# Patient Record
Sex: FEMALE | Race: WHITE | NOT HISPANIC OR LATINO | Employment: FULL TIME | URBAN - METROPOLITAN AREA
[De-identification: names, ages, dates, MRNs, and addresses within clinical notes are randomized per-mention and may not be internally consistent; named-entity substitution may affect disease eponyms.]

---

## 2017-05-08 ENCOUNTER — HOSPITAL ENCOUNTER (OUTPATIENT)
Dept: OTHER | Facility: HOSPITAL | Age: 57
Discharge: HOME | End: 2017-05-08

## 2017-11-08 ENCOUNTER — HOSPITAL ENCOUNTER (OUTPATIENT)
Dept: OTHER | Facility: HOSPITAL | Age: 57
Discharge: HOME | End: 2017-11-08

## 2019-06-06 ENCOUNTER — HOSPITAL ENCOUNTER (OUTPATIENT)
Dept: OTHER | Facility: HOSPITAL | Age: 59
Discharge: HOME | End: 2019-06-06

## 2020-06-29 ENCOUNTER — HOSPITAL ENCOUNTER (OUTPATIENT)
Dept: OTHER | Facility: HOSPITAL | Age: 60
Discharge: HOME | End: 2020-06-29

## 2020-07-13 ENCOUNTER — HOSPITAL ENCOUNTER (OUTPATIENT)
Dept: OTHER | Facility: HOSPITAL | Age: 60
Discharge: HOME | End: 2020-07-13

## 2021-03-08 ENCOUNTER — HOSPITAL ENCOUNTER (OUTPATIENT)
Dept: OTHER | Facility: HOSPITAL | Age: 61
Discharge: HOME | End: 2021-03-08

## 2021-05-18 ENCOUNTER — OFFICE VISIT CONVERTED (OUTPATIENT)
Dept: GASTROENTEROLOGY | Facility: CLINIC | Age: 61
End: 2021-05-18
Attending: INTERNAL MEDICINE

## 2021-06-05 NOTE — H&P
History and Physical      Patient Name: Rajwinder An   Patient ID: [id number]   Sex: Female   YOB: 1960    Primary Care Provider: Wing Pederson   Referring Provider: Wing Pederson    Visit Date: May 18, 2021    Provider: Debra Wilkinson MD   Location: [facility]   Location Address: [address] [place] [address], [address]  [place], [place]  [id number]   Location Phone: [phone]          Chief Complaint  · Diarrhea      History Of Present Illness  The patient is a 60 year old /White female who presents on referral from Wing Pederson for a gastroenterology evaluation.      Ms. 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   Shreports h/o partial colon resection 2/2 endometriosis.  If not taking meds, will have 5 - 6 bowel movements daily.  Reports urgency with bowel movements.  No melena, hematochezia.  Reports colonoscopy 2013 for diarrhea and symptoms have persisted since.  Colonoscopy 2020 with microscopic colitis.  Has tried PeptoBismol, Colestid, Imodium.  Will have to take 2 Imodium daily with some relief.  Left upper/lower abd pain, constant, cramping, no radiation, mild.  Occ. N/V with improvement in abd pain.    Labs (5/6/21): WBC 6.2, Hgb 15.0, Plt 161, AST 48, ALT 30, alk phos 113, TB 1.8.    Colonoscopy (6/15/20): normal colon, small chronic external hemorrhoid.  Random bx's with microscopic colitis.       Past Surgical History  Colon resection; Colonoscopy; Hysterectomy         Medication List  Crestor 20 mg oral tablet; diclofenac sodium 100 mg oral tablet extended release 24 hr; escitalopram oxalate 5 mg oral tablet; lisinopril 20 mg oral tablet; Victoza 2-Jef 0.6 mg/0.1 mL (18 mg/3 mL) subcutaneous pen injector; Vitamin D3 Complete 18 mg iron-800 mcg-150 mg oral tablet; Zyrtec 10 mg oral tablet         Allergy List  PENICILLINS       Allergies Reconciled  Family Medical History  No family history of colorectal cancer  "        Social History  Tobacco (Never)         Review of Systems  · Constitutional  o Denies  o : chills, fever  · Eyes  o Denies  o : blurred vision, changes in vision  · Cardiovascular  o Denies  o : chest pain  · Respiratory  o Denies  o : shortness of breath  · Gastrointestinal  o Admits  o : See HPI  · Genitourinary  o Denies  o : dysuria, blood in urine  · Integument  o Denies  o : rash  · Neurologic  o Denies  o : tingling or numbness  · Musculoskeletal  o Denies  o : joint pain  · Endocrine  o Denies  o : weight gain, weight loss  · Psychiatric  o Denies  o : anxiety, depression      Vitals  Date Time BP Position Site L\R Cuff Size HR RR TEMP (F) WT  HT  BMI kg/m2 BSA m2 O2 Sat FR L/min FiO2        05/18/2021 02:58 /75 Sitting    83 - R   179lbs 0oz 5'  6\" 28.89 1.94             Physical Examination  · Constitutional  o Appearance  o : well developed, well-nourished, in no acute distress  · Eyes  o Vision  o :   § Visual Fields  § : eyes move symmetrical in all directions  o Sclerae  o : anicteric  o Pupils and Irises  o : pupils equal and symmetrical  · Neck  o Inspection/Palpation  o : supple  · Respiratory  o Respiratory Effort  o : breathing unlabored  o Inspection of Chest  o : normal appearance, no retractions  o Auscultation of Lungs  o : clear to auscultation bilaterally  · Cardiovascular  o Heart  o :   § Auscultation of Heart  § : no murmurs, gallops or rubs  · Gastrointestinal  o Abdominal Examination  o : soft, LUQ/LLQ tender to palpation, with normal active bowel sounds, no appreciable hepatosplenomegaly  o Digital Rectal Exam  o : deferred  · Lymphatic  o Neck  o : no palpable lymphadenopathy  · Skin and Subcutaneous Tissue  o General Inspection  o : without focal lesions; turgor is normal  · Psychiatric  o General  o : Alert and oriented x3  o Mood and Affect  o : Mood and affect are appropriate to circumstances          Assessment  · Abdominal " pain     789.00/R10.9  · Diarrhea     787.91/R19.7  · Microscopic colitis     558.9/K52.839    Problems Reconciled  Plan  · Orders  o C difficile Toxigenic Assay (PCR) [facility] (87955) - - 05/18/2021  o Stool culture and sensitivity (33984) - - 05/18/2021  o Giardia and Cryptosporidium Enzyme Immunoassay [facility] (70208, 61834) - - 05/18/2021  o Lactoferrin (Fecal) Qualitative (32455) - - 05/18/2021  · Medications  o budesonide 3 mg oral capsule,delayed,extend.release   SIG: take 3 capsules (9 mg) oral daily x 4 weeks, then 2 capsules oral daily x 2 weeks, then 1 capsule oral daily x 2 weeks   DISP: (126) Capsule with 0 refills  Prescribed on 05/18/2021     o Medications have been Reconciled  o Transition of Care or Provider Policy  · Instructions  o Diarrhea: will check stool studies to r/o other conditions. Given h/o of microscopic colitis on recent colonoscopy, will do budesonide with taper. Advised importance of completing stool studies prior to starting budesonide. Also discussed increased monitoring of glucose with steroid use in setting of DM.  · Disposition  o Follow Up in 4 months.            Electronically Signed by: Debra Wilkinson MD -Author on May 19, 2021 07:48:32 AM

## 2021-07-15 VITALS
HEART RATE: 83 BPM | SYSTOLIC BLOOD PRESSURE: 145 MMHG | DIASTOLIC BLOOD PRESSURE: 75 MMHG | BODY MASS INDEX: 28.77 KG/M2 | HEIGHT: 66 IN | WEIGHT: 179 LBS

## 2021-08-20 ENCOUNTER — HOSPITAL ENCOUNTER (OUTPATIENT)
Dept: OTHER | Facility: HOSPITAL | Age: 61
Discharge: HOME | End: 2021-08-20

## 2021-08-31 ENCOUNTER — HOSPITAL ENCOUNTER (OUTPATIENT)
Dept: OTHER | Facility: HOSPITAL | Age: 61
Discharge: HOME | End: 2021-08-31

## 2021-10-13 ENCOUNTER — HOSPITAL ENCOUNTER (OUTPATIENT)
Dept: OTHER | Facility: HOSPITAL | Age: 61
Discharge: HOME | End: 2021-10-13

## 2022-06-22 ENCOUNTER — HOSPITAL ENCOUNTER (OUTPATIENT)
Dept: OTHER | Facility: HOSPITAL | Age: 62
Discharge: HOME | End: 2022-06-22

## 2023-03-02 DIAGNOSIS — M25.511 RIGHT SHOULDER PAIN, UNSPECIFIED CHRONICITY: Primary | ICD-10-CM

## 2023-03-14 ENCOUNTER — OFFICE VISIT (OUTPATIENT)
Dept: ORTHOPEDIC SURGERY | Facility: CLINIC | Age: 63
End: 2023-03-14
Payer: COMMERCIAL

## 2023-03-14 ENCOUNTER — HOSPITAL ENCOUNTER (OUTPATIENT)
Dept: GENERAL RADIOLOGY | Facility: HOSPITAL | Age: 63
Discharge: HOME | End: 2023-03-14
Admitting: PHYSICIAN ASSISTANT
Payer: COMMERCIAL

## 2023-03-14 VITALS — HEIGHT: 63 IN | TEMPERATURE: 97.8 F | WEIGHT: 174.6 LBS | BODY MASS INDEX: 30.94 KG/M2

## 2023-03-14 DIAGNOSIS — R29.898 SHOULDER WEAKNESS: ICD-10-CM

## 2023-03-14 DIAGNOSIS — G89.29 CHRONIC RIGHT SHOULDER PAIN: Primary | ICD-10-CM

## 2023-03-14 DIAGNOSIS — M25.511 CHRONIC RIGHT SHOULDER PAIN: Primary | ICD-10-CM

## 2023-03-14 DIAGNOSIS — M25.511 RIGHT SHOULDER PAIN, UNSPECIFIED CHRONICITY: ICD-10-CM

## 2023-03-14 PROCEDURE — 99204 OFFICE O/P NEW MOD 45 MIN: CPT | Performed by: PHYSICIAN ASSISTANT

## 2023-03-14 PROCEDURE — 73030 X-RAY EXAM OF SHOULDER: CPT

## 2023-03-14 RX ORDER — DICLOFENAC SODIUM 75 MG/1
TABLET, DELAYED RELEASE ORAL
COMMUNITY
Start: 2022-11-16

## 2023-03-14 RX ORDER — ESCITALOPRAM OXALATE 20 MG/1
TABLET ORAL
COMMUNITY
Start: 2022-11-16

## 2023-03-14 RX ORDER — CETIRIZINE HYDROCHLORIDE 10 MG/1
TABLET ORAL
COMMUNITY

## 2023-03-14 RX ORDER — SIMVASTATIN 5 MG
TABLET ORAL
COMMUNITY
Start: 2023-03-08

## 2023-03-14 RX ORDER — ERGOCALCIFEROL 1.25 MG/1
CAPSULE ORAL
COMMUNITY
Start: 2022-11-21

## 2023-03-14 RX ORDER — LIRAGLUTIDE 6 MG/ML
INJECTION SUBCUTANEOUS
COMMUNITY
Start: 2023-02-02

## 2023-03-14 RX ORDER — FAMOTIDINE 40 MG/1
TABLET, FILM COATED ORAL
COMMUNITY
Start: 2022-11-16

## 2023-03-14 RX ORDER — LISINOPRIL 20 MG/1
TABLET ORAL
COMMUNITY
Start: 2022-11-16

## 2023-03-14 RX ORDER — ONDANSETRON 4 MG/1
TABLET, FILM COATED ORAL
COMMUNITY
Start: 2023-03-08

## 2023-03-14 RX ORDER — CARVEDILOL 12.5 MG/1
TABLET ORAL
COMMUNITY
Start: 2023-03-08

## 2023-03-14 RX ORDER — EZETIMIBE 10 MG/1
TABLET ORAL
COMMUNITY
Start: 2022-11-16

## 2023-03-14 NOTE — PROGRESS NOTES
"Chief Complaint  Pain and Establish Care of the Right Shoulder    Subjective    History of Present Illness      Rajwinder An is a 62 y.o. female who presents to [facility] for complaint of Shoulder Pain: Patient complains of right shoulder pain.   The onset of the pain was gradual, starting about 1 year ago.    Symptoms are aggravated by raising her right arm up and sleeping on her side, bilaterally.   Symptoms are diminished by ice and diclofenac.         She reports her right shoulder began bothering her approximately 1 year ago. When it began, it was only aching and \"twinging\", these symptoms would radiate from her right shoulder to the lateral aspect of the upper arm. She reports when she raises her arm \"it would feel just like it was giving away\". The patient went to her family doctor and was referred to see Dr. Denise in [place]. She states she was informed she had a bone spur and received regular injections. Her most recent injection was approximately 6 months ago. She reports her symptoms are worsening. She experienced relief with the injections for a short period of time, she notes the first injection helped the most. Over time, the injection's effects are not lasting long.    The patient reports her pain is aggravated when she raises her arm up and sleeps on her side, bilaterally. She states she is unable to sleep on her left secondary to \"pulling\" on the shoulder. She will still experience pain even if she sits in a recliner. The patient notices improvement with ice and with her prescription of diclofenac. The patient's family doctor diagnosed the patient with BELL in her liver and was advised to use diclofenac very sparingly. She sees Carlos Marino MD for BELL. The patient experiences weakness and decrease in range of motion. Her range of motion worsens if she raises her arm. Intermittently, she would use her left arm to lift her right arm. She states " "she has difficulty lifting the arm above her head.    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        Objective   Vital Signs:   Temp 97.8 °F (36.6 °C)   Ht 158.8 cm (62.5\")   Wt 79.2 kg (174 lb 9.6 oz)   BMI 31.43 kg/m²       Physical Exam  Vitals and nursing note reviewed.   Constitutional:       Appearance: Normal appearance.   Pulmonary:      Effort: Pulmonary effort is normal.   Skin:     General: Skin is warm and dry.      Capillary Refill: Capillary refill takes less than 2 seconds.   Neurological:      General: No focal deficit present.      Mental Status: He is alert and oriented to person, place, and time. Mental status is at baseline.   Psychiatric:         Mood and Affect: Mood normal.         Behavior: Behavior normal.         Thought Content: Thought content normal.         Judgment: Judgment normal.         Ortho Exam   RIGHT shoulder  Positive for significant tenderness at the anterior aspect of the shoulder and at the insertion of the rotator cuff over the greater tuberosity of the humerus.   Positive for tenderness with palpation of the AC joint.  Soft tissue tenderness is noted.   Slight proximal migration of the humeral head is noted.   Forward flexion is 0-100 degrees, abduction is 0-90 degrees, external rotation is 0-30 degrees.   Behind back to low back. Assisted ROM she obtains increased ROM.  Positive for decreased strength in abduction and external rotation.   Crossover adduction test is positive.    Drop arm sign is positive.  Sulcus sign is negative.   Neer test is positive on compression.  There is no evidence of multidirectional instability.       Result Review :   Radiologic studies - see below for interpretation  RIGHT shoulder xrays   4 views were ordered by Lebron Starks PA-C. Performed at [facility] on 3/14/23. Images were independently viewed and interpreted " by myself, my impression as follows:  Findings: Moderate degenerative changes of the AC joint, mild to moderate degenerative changes of the glenohumeral joint, primarily at the bony glenoid  Bony lesion: no  Soft tissues: within normal limits  Joint spaces: decreased  Hardware appropriately positioned: not applicable  Prior studies available for comparison: yes from 2022    RIGHT shoulder xrays   3 views were performed at [facility] on 3/28/22. Images were independently viewed and interpreted by myself, my impression as follows:  · Mild degenerative changes of the before meals and glenohumeral joint        PROCEDURE  Procedures           Assessment   Assessment and Plan    Problem List Items Addressed This Visit        Musculoskeletal and Injuries    Chronic right shoulder pain - Primary    Relevant Orders    MRI Shoulder Right Without Contrast   Other Visit Diagnoses     Shoulder weakness        Relevant Orders    MRI Shoulder Right Without Contrast          Follow Up   · Discussion of any imaging in detail. Discussion of orthopaedic goals.  · Risk, benefits, and merits of treatment alternatives reviewed with the patient. Treatment alternatives include: further imaging/testing  · Ice, heat, and/or modalities as beneficial  · To schedule MRI of right shoulder without contrast to evaluate for RCT  · Patient is encouraged to call or return for any issues or concerns.  · No brace was given at today's visit.  · Follow up will be based on when MRI has been performed  • Patient was given instructions and counseling regarding her condition or for health maintenance advice. Please see specific information pulled into the AVS if appropriate.     Lebron Starks PA-C    Date of Encounter: 3/14/2023     Transcribed from ambient dictation for Lebron Starks PA-C by Radha Schneider.  03/14/23   13:20 EDT    Patient or patient representative verbalized consent to the visit recording.  I have personally  performed the services described in this document as transcribed by the above individual, and it is both accurate and complete.  Lebron Starks PA-C  3/14/2023  13:43 EDT

## 2023-03-17 ENCOUNTER — PATIENT ROUNDING (BHMG ONLY) (OUTPATIENT)
Dept: ORTHOPEDIC SURGERY | Facility: CLINIC | Age: 63
End: 2023-03-17
Payer: COMMERCIAL

## 2023-03-17 NOTE — PROGRESS NOTES
March 17, 2023    A [employer] Message has been sent to the patient for PATIENT ROUNDING with [facility]

## 2023-03-24 ENCOUNTER — TELEPHONE (OUTPATIENT)
Dept: ORTHOPEDIC SURGERY | Facility: CLINIC | Age: 63
End: 2023-03-24

## 2023-03-24 NOTE — TELEPHONE ENCOUNTER
Caller: KINZA MAYFIELD    Relationship to patient: SELF    Best call back number:[phone]    Patient is needing: REQ STATUS OF MRI BEING SCHEDULED- PLEASE CONTACT PATIENT TO GIVE STATUS UPDATE- PATIENT STATES OK TO LEAVE MSG ON PHONE IF SHE IS NOT ABLE TO ANSWER

## 2023-04-19 ENCOUNTER — TELEPHONE (OUTPATIENT)
Dept: ORTHOPEDIC SURGERY | Facility: CLINIC | Age: 63
End: 2023-04-19
Payer: COMMERCIAL

## 2023-04-19 NOTE — TELEPHONE ENCOUNTER
SPOKE TO PATIENT AND CANCELLED APPOINTMENT UNTIL SHE HAS HAD PHYSICAL THERAPY FOR 4 WEEKS.    ORDER WILL BE SENT TO [facility] AND THEN PATIENT WILL CALL BACK TO SCHEDULE A FOLLOW UP AS SOON AS SHE KNOWS HER FIRST P.T.VISIT

## 2023-05-08 ENCOUNTER — HOSPITAL ENCOUNTER (OUTPATIENT)
Dept: OTHER | Facility: HOSPITAL | Age: 63
Discharge: HOME | End: 2023-05-08

## 2024-10-11 ENCOUNTER — TELEPHONE (OUTPATIENT)
Dept: GASTROENTEROLOGY | Facility: CLINIC | Age: 64
End: 2024-10-11
Payer: COMMERCIAL

## 2024-10-11 NOTE — TELEPHONE ENCOUNTER
Please advise on referral, Do we see for this DX. Imaging from Feb.   Referral message sent, TE made for documentation purposes.

## 2024-10-14 ENCOUNTER — OFFICE VISIT (OUTPATIENT)
Dept: GASTROENTEROLOGY | Facility: CLINIC | Age: 64
End: 2024-10-14
Payer: COMMERCIAL

## 2024-10-14 ENCOUNTER — TELEPHONE (OUTPATIENT)
Dept: GASTROENTEROLOGY | Facility: CLINIC | Age: 64
End: 2024-10-14
Payer: COMMERCIAL

## 2024-10-14 VITALS
DIASTOLIC BLOOD PRESSURE: 67 MMHG | WEIGHT: 180 LBS | HEART RATE: 63 BPM | BODY MASS INDEX: 33.13 KG/M2 | HEIGHT: 62 IN | SYSTOLIC BLOOD PRESSURE: 168 MMHG

## 2024-10-14 DIAGNOSIS — K74.60 CIRRHOSIS OF LIVER WITHOUT ASCITES, UNSPECIFIED HEPATIC CIRRHOSIS TYPE: ICD-10-CM

## 2024-10-14 DIAGNOSIS — R15.2 INCONTINENCE OF FECES WITH FECAL URGENCY: ICD-10-CM

## 2024-10-14 DIAGNOSIS — K75.81 NASH (NONALCOHOLIC STEATOHEPATITIS): ICD-10-CM

## 2024-10-14 DIAGNOSIS — I85.00 ESOPHAGEAL VARICES WITHOUT BLEEDING, UNSPECIFIED ESOPHAGEAL VARICES TYPE: ICD-10-CM

## 2024-10-14 DIAGNOSIS — R15.9 INCONTINENCE OF FECES WITH FECAL URGENCY: ICD-10-CM

## 2024-10-14 DIAGNOSIS — K52.839 MICROSCOPIC COLITIS, UNSPECIFIED MICROSCOPIC COLITIS TYPE: ICD-10-CM

## 2024-10-14 DIAGNOSIS — R19.7 DIARRHEA, UNSPECIFIED TYPE: Primary | ICD-10-CM

## 2024-10-14 PROCEDURE — 99204 OFFICE O/P NEW MOD 45 MIN: CPT | Performed by: NURSE PRACTITIONER

## 2024-10-14 RX ORDER — MONTELUKAST SODIUM 4 MG/1
1 TABLET, CHEWABLE ORAL 2 TIMES DAILY
Qty: 30 TABLET | Refills: 3 | Status: SHIPPED | OUTPATIENT
Start: 2024-10-14

## 2024-10-14 RX ORDER — BRIMONIDINE TARTRATE AND TIMOLOL MALEATE 2; 5 MG/ML; MG/ML
SOLUTION OPHTHALMIC
COMMUNITY
Start: 2024-07-11

## 2024-10-14 RX ORDER — ACYCLOVIR 400 MG/1
TABLET ORAL
COMMUNITY
Start: 2024-10-07

## 2024-10-14 RX ORDER — INSULIN GLARGINE 100 [IU]/ML
INJECTION, SOLUTION SUBCUTANEOUS NIGHTLY
COMMUNITY

## 2024-10-14 RX ORDER — INSULIN LISPRO 100 [IU]/ML
INJECTION, SOLUTION INTRAVENOUS; SUBCUTANEOUS
COMMUNITY

## 2024-10-14 RX ORDER — ONDANSETRON 4 MG/1
4 TABLET, FILM COATED ORAL EVERY 8 HOURS PRN
Qty: 30 TABLET | Refills: 2 | Status: SHIPPED | OUTPATIENT
Start: 2024-10-14

## 2024-10-14 NOTE — PROGRESS NOTES
Chief Complaint        Diarrhea, Cirrhosis, and Abdominal Pain    History of Present Illness      Rajwinder An is a 64 y.o. female who presents to [facility] as a new patient for cirrhosis.      History of Vang cirrhosis with type 2 diabetes.  History of esophageal varices. Had previously been seen by Dr. Sidhu at  hepatology.     History diarrhea--admits its been ongoing for years. But now its getting worse. She admits she has been having 3-4 episodes a day. She admits as soon as she eats she has to run to the bathroom. She admits she has tried remedies over the counter without help. She denies any rectal bleeding or melena. She does have some fecal incontinence.       Last EGD/colonoscopy----last EGD 6/22 at . Last colonoscopy was about 3-4 years ago at [facility]. Denies any history colon polyps.     GI family history--- maternal grandmother with liver cancer.    History of cholecystectomy, hysterectomy, tonsillectomy and colon resection---secondary to endometriosis.     Most recent MRCP was done at [facility] on 2/21/2024.  It showed cirrhosis.  No significant hepatic fat deposition.  Spleen enlarged measuring 14.3 cm.  There are bilateral renal cysts largest at the lower right kidney measures 2.6 cm.  No ascites.  There are multiple small cysts scattered throughout the pancreas no significant change from the previous study.  A cyst at the body of the pancreas measures approximately 7 mm.  Previous cholecystectomy.  Subtle early enhancing nodule in the inferior tip of the liver measures approximately 8 mm and is unchanged from the previous study.  This nodule shows no appreciated to have washout and is more subtle than on the previous study.  No definite evidence for hepatocellular carcinoma.  No abnormal enhancement elsewhere.  There are gastrosplenic varices.    She was last seen in the [facility] hepatology center on 3/1/2024.  According to  "their records she has a history of microscopic colitis.  Cyst of her pancreas dating back to February 2023.  Esophageal varices due to cirrhosis of the liver dating back to August 2022.  Diagnosed initially with nonalcoholic cirrhosis in December 2021.  Had abnormal finding on diagnostic imaging of the liver at that same time.    Most recent labs done this past May showed elevated AST at 37.  Alkaline phosphatase was normal.  Total bilirubin normal.  ALT normal.    She does have hx chronic nausea-----will use zofran and pepcid PRN    Denies any confusion, jaundice or pedal edema.  Results       Result Review :   The following data was reviewed by: ALBA Bose on 10/14/2024                 Lipase No results found for: \"LIPASE\"  Amylase No results found for: \"AMYLASE\"  Iron Profile No results found for: \"IRON\", \"TIBC\", \"LABIRON\", \"TRANSFERRIN\"  Ferritin No results found for: \"FERRITIN\"  Liver Workup   A-1 Antitrypsin   Date Value Ref Range Status   01/21/2022 157 83 - 199 mg/dL Final     AFP Tumor Marker   Date Value Ref Range Status   05/19/2023 3.8 ng/mL Final     Comment:     Reference Range:   <6.1  The use of AFP as a tumor marker in pregnant  females is not recommended.      This test was performed using the U[employer] Delia  chemiluminescent method. Values obtained from  different assay methods cannot be used  interchangeably. AFP levels, regardless of  value, should not be interpreted as absolute  evidence of the presence or absence of disease.     Ceruloplasmin   Date Value Ref Range Status   01/21/2022 28 18 - 53 mg/dL Final     Protime   Date Value Ref Range Status   11/18/2022 10.4 9.0 - 11.5 sec Final     Comment:     For additional information, please refer to  http://education.[employer]/faq/CHB902  (This link is being provided for informational/  educational purposes only.)     INR   Date Value Ref Range Status   05/19/2023 1 0.9 - 1.1 Final     Comment:     Moderate-intensity " "Warfarin Therapy     2.0-3.0  Higher-intensity Warfarin Therapy         3.0-4.0     AFP No results found for: \"AFP\"   PT/INR   Protime   Date Value Ref Range Status   11/18/2022 10.4 9.0 - 11.5 sec Final     Comment:     For additional information, please refer to  http://[employer]ojin/faq/XIA959  (This link is being provided for informational/  educational purposes only.)     INR   Date Value Ref Range Status   05/19/2023 1 0.9 - 1.1 Final     Comment:     Moderate-intensity Warfarin Therapy     2.0-3.0  Higher-intensity Warfarin Therapy         3.0-4.0     Ammonia No results found for: \"AMMONIA\"         Past Medical History       Past Medical History:   Diagnosis Date    Acid reflux     Cirrhosis     Diabetes     Esophageal varices     Gastroparesis     Hiatal hernia     Hyperlipidemia     Hypertension        Past Surgical History:   Procedure Laterality Date    CHOLECYSTECTOMY      COLON RESECTION      COLONOSCOPY      HYSTERECTOMY      TONSILLECTOMY      UPPER GASTROINTESTINAL ENDOSCOPY      [facility]         Current Outpatient Medications:     brimonidine-timolol (COMBIGAN) 0.2-0.5 % ophthalmic solution, INSTILL ONE DROP INTO BOTH EYES TWICE DAILY, Disp: , Rfl:     carvedilol (COREG) 12.5 MG tablet, , Disp: , Rfl:     cetirizine (ZyrTEC Allergy) 10 MG tablet, Zyrtec 10 mg oral tablet take 1 tablet (10 mg) by oral route once daily   Active, Disp: , Rfl:     Continuous Glucose Sensor (Dexcom G7 Sensor) misc, CHANGE EVERY 10 DAYS, Disp: , Rfl:     diclofenac (VOLTAREN) 75 MG EC tablet, , Disp: , Rfl:     escitalopram (LEXAPRO) 20 MG tablet, , Disp: , Rfl:     ezetimibe (ZETIA) 10 MG tablet, , Disp: , Rfl:     famotidine (PEPCID) 40 MG tablet, , Disp: , Rfl:     insulin glargine (LANTUS, SEMGLEE) 100 UNIT/ML injection, Inject  under the skin into the appropriate area as directed Every Night., Disp: , Rfl:     Insulin Lispro (humaLOG) 100 UNIT/ML injection, Inject  under the skin into the " "appropriate area as directed 3 (Three) Times a Day Before Meals., Disp: , Rfl:     lisinopril (PRINIVIL,ZESTRIL) 20 MG tablet, , Disp: , Rfl:     ondansetron (ZOFRAN) 4 MG tablet, Take 1 tablet by mouth Every 8 (Eight) Hours As Needed for Nausea or Vomiting., Disp: 30 tablet, Rfl: 2    simvastatin (ZOCOR) 5 MG tablet, , Disp: , Rfl:     vitamin D (ERGOCALCIFEROL) 1.25 MG (97638 UT) capsule capsule, , Disp: , Rfl:     colestipol (COLESTID) 1 g tablet, Take 1 tablet by mouth 2 (Two) Times a Day., Disp: 30 tablet, Rfl: 3     Allergies   Allergen Reactions    Penicillins Rash     As a Child          Family History   Problem Relation Age of Onset    Heart disease Mother     Hypertension Mother     Hypertension Father     Heart disease Father     Diabetes Maternal Aunt     Diabetes Maternal Uncle         Social History     Social History Narrative    Not on file       Objective       Objective     Vital Signs:   /67 (BP Location: Left arm, Patient Position: Sitting, Cuff Size: Adult)   Pulse 63   Ht 157.5 cm (62\")   Wt 81.6 kg (180 lb)   BMI 32.92 kg/m²     Body mass index is 32.92 kg/m².    Review of Systems   Constitutional:  Negative for appetite change, chills, diaphoresis, fatigue, fever and unexpected weight change.   HENT:  Negative for nosebleeds, postnasal drip, sore throat, trouble swallowing and voice change.    Respiratory:  Negative for cough, choking, chest tightness, shortness of breath, wheezing and stridor.    Cardiovascular:  Negative for chest pain, palpitations and leg swelling.   Gastrointestinal:  Positive for diarrhea. Negative for abdominal distention, abdominal pain, anal bleeding, blood in stool, constipation, nausea, rectal pain and vomiting.   Endocrine: Negative for polydipsia, polyphagia and polyuria.   Musculoskeletal:  Negative for gait problem.   Skin:  Negative for rash and wound.   Allergic/Immunologic: Negative for food allergies.   Neurological:  Negative for dizziness, " speech difficulty and light-headedness.   Psychiatric/Behavioral:  Negative for confusion, self-injury, sleep disturbance and suicidal ideas.         Physical Exam  Constitutional:       General: She is not in acute distress.     Appearance: She is well-developed. She is not ill-appearing.   HENT:      Head: Normocephalic.   Eyes:      General: No scleral icterus.     Pupils: Pupils are equal, round, and reactive to light.   Cardiovascular:      Rate and Rhythm: Normal rate and regular rhythm.      Heart sounds: Normal heart sounds.   Pulmonary:      Effort: Pulmonary effort is normal.      Breath sounds: Normal breath sounds.   Abdominal:      General: Bowel sounds are normal. There is no distension.      Palpations: Abdomen is soft. There is no mass.      Tenderness: There is no abdominal tenderness. There is no guarding or rebound.      Hernia: No hernia is present.   Musculoskeletal:         General: Normal range of motion.      Right lower leg: No edema.      Left lower leg: No edema.   Skin:     General: Skin is warm and dry.      Coloration: Skin is not jaundiced.   Neurological:      Mental Status: She is alert and oriented to person, place, and time.   Psychiatric:         Speech: Speech normal.         Behavior: Behavior normal.         Judgment: Judgment normal.              Assessment & Plan          Assessment and Plan    Diagnoses and all orders for this visit:    1. Diarrhea, unspecified type (Primary)  -     colestipol (COLESTID) 1 g tablet; Take 1 tablet by mouth 2 (Two) Times a Day.  Dispense: 30 tablet; Refill: 3  -     Calprotectin, Fecal - Stool, Per Rectum; Future  -     Clostridioides difficile Toxin - Stool, Per Rectum; Future  -     Enteric Bacterial Panel - Stool, Per Rectum; Future  -     Enteric Parasite Panel - Stool, Per Rectum; Future  -     Pancreatic Elastase, Fecal - Stool, Per Rectum; Future    2. Cirrhosis of liver without ascites, unspecified hepatic cirrhosis type  -      Comprehensive Metabolic Panel  -     Ammonia; Future  -     Protime-INR; Future  -     US Liver; Future    3. BELL (nonalcoholic steatohepatitis)  -     Comprehensive Metabolic Panel  -     Ammonia; Future  -     Protime-INR; Future  -     US Liver; Future    4. Microscopic colitis, unspecified microscopic colitis type    5. Incontinence of feces with fecal urgency    6. Esophageal varices without bleeding, unspecified esophageal varices type    Other orders  -     ondansetron (ZOFRAN) 4 MG tablet; Take 1 tablet by mouth Every 8 (Eight) Hours As Needed for Nausea or Vomiting.  Dispense: 30 tablet; Refill: 2      Reviewed medical history with her today.  Given the worsening diarrhea we will check stool studies to start.  Will trial her on colestipol and see how she does given she does have a history of cholecystectomy.  She does have a history of microscopic colitis so we will have to see what her stool studies come back as.  Okay to use Imodium OTC as needed.  Will check labs for cirrhosis.  Will check liver ultrasound for HCC monitoring.  Once we get labs back we can calculate her MELD score.  She is due for screening EGD for surveillance of varices.  We would like to wait and do it with her screening colonoscopy.  But it depends on how her diarrhea does.  We may scope her earlier rather than later.  Will obtain colonoscopy records.  Patient to call the office in 1 week with an update.  Patient to follow-up with me in 3 months.  Patient is agreeable to the plan.          Follow Up       Follow Up   Return in about 3 months (around 1/14/2025) for CIRRHOSIS, DIARRHEA.  Patient was given instructions and counseling regarding her condition or for health maintenance advice. Please see specific information pulled into the AVS if appropriate.

## 2024-10-14 NOTE — TELEPHONE ENCOUNTER
Patient is scheduled with Mirela Yuval on 10/14/204 at 3:15 for diarrhea/liver mass. Ok per Mirela

## 2024-10-17 ENCOUNTER — LAB (OUTPATIENT)
Dept: LAB | Facility: HOSPITAL | Age: 64
End: 2024-10-17
Payer: COMMERCIAL

## 2024-10-17 DIAGNOSIS — R19.7 DIARRHEA, UNSPECIFIED TYPE: ICD-10-CM

## 2024-10-17 DIAGNOSIS — K75.81 NASH (NONALCOHOLIC STEATOHEPATITIS): ICD-10-CM

## 2024-10-17 DIAGNOSIS — K74.60 CIRRHOSIS OF LIVER WITHOUT ASCITES, UNSPECIFIED HEPATIC CIRRHOSIS TYPE: ICD-10-CM

## 2024-10-17 LAB
027 TOXIN: NORMAL
ALBUMIN SERPL-MCNC: 4 G/DL (ref 3.5–5.2)
ALBUMIN/GLOB SERPL: 1.3 G/DL
ALP SERPL-CCNC: 98 U/L (ref 39–117)
ALT SERPL W P-5'-P-CCNC: 23 U/L (ref 1–33)
AMMONIA BLD-SCNC: 68 UMOL/L (ref 11–51)
ANION GAP SERPL CALCULATED.3IONS-SCNC: 7.1 MMOL/L (ref 5–15)
AST SERPL-CCNC: 37 U/L (ref 1–32)
BILIRUB SERPL-MCNC: 1.1 MG/DL (ref 0–1.2)
BUN SERPL-MCNC: 13 MG/DL (ref 8–23)
BUN/CREAT SERPL: 15.9 (ref 7–25)
C DIFF TOX GENS STL QL NAA+PROBE: NEGATIVE
CALCIUM SPEC-SCNC: 9.2 MG/DL (ref 8.6–10.5)
CHLORIDE SERPL-SCNC: 105 MMOL/L (ref 98–107)
CO2 SERPL-SCNC: 25.9 MMOL/L (ref 22–29)
CREAT SERPL-MCNC: 0.82 MG/DL (ref 0.57–1)
EGFRCR SERPLBLD CKD-EPI 2021: 81 ML/MIN/1.73
GLOBULIN UR ELPH-MCNC: 3.1 GM/DL
GLUCOSE SERPL-MCNC: 250 MG/DL (ref 65–99)
INR PPP: 1.01 (ref 0.86–1.15)
POTASSIUM SERPL-SCNC: 3.6 MMOL/L (ref 3.5–5.2)
PROT SERPL-MCNC: 7.1 G/DL (ref 6–8.5)
PROTHROMBIN TIME: 13.5 SECONDS (ref 11.8–14.9)
SODIUM SERPL-SCNC: 138 MMOL/L (ref 136–145)

## 2024-10-17 PROCEDURE — 87506 IADNA-DNA/RNA PROBE TQ 6-11: CPT

## 2024-10-17 PROCEDURE — 85610 PROTHROMBIN TIME: CPT

## 2024-10-17 PROCEDURE — 82140 ASSAY OF AMMONIA: CPT

## 2024-10-17 PROCEDURE — 83993 ASSAY FOR CALPROTECTIN FECAL: CPT

## 2024-10-17 PROCEDURE — 82653 EL-1 FECAL QUANTITATIVE: CPT

## 2024-10-17 PROCEDURE — 36415 COLL VENOUS BLD VENIPUNCTURE: CPT

## 2024-10-17 PROCEDURE — 80053 COMPREHEN METABOLIC PANEL: CPT | Performed by: NURSE PRACTITIONER

## 2024-10-17 PROCEDURE — 87493 C DIFF AMPLIFIED PROBE: CPT

## 2024-10-18 LAB
C COLI+JEJ+UPSA DNA STL QL NAA+NON-PROBE: NOT DETECTED
CRYPTOSP DNA STL QL NAA+NON-PROBE: NOT DETECTED
E HISTOLYT DNA STL QL NAA+NON-PROBE: NOT DETECTED
EC STX1+STX2 GENES STL QL NAA+NON-PROBE: NOT DETECTED
ELASTASE PANC STL-MCNT: >800 UG ELAST./G
G LAMBLIA DNA STL QL NAA+NON-PROBE: NOT DETECTED
S ENT+BONG DNA STL QL NAA+NON-PROBE: NOT DETECTED
SHIGELLA SP+EIEC IPAH ST NAA+NON-PROBE: NOT DETECTED

## 2024-10-21 ENCOUNTER — TELEPHONE (OUTPATIENT)
Dept: GASTROENTEROLOGY | Facility: CLINIC | Age: 64
End: 2024-10-21
Payer: COMMERCIAL

## 2024-10-21 DIAGNOSIS — K76.82 HEPATIC ENCEPHALOPATHY: Primary | ICD-10-CM

## 2024-10-21 NOTE — TELEPHONE ENCOUNTER
----- Message from Mirela Yuval sent at 10/21/2024 11:44 AM EDT -----  Ammonia level is elevated at 68.  This can lead to brain fog, confusion and fatigue.  Would like to start her on Xifaxan twice a day for suspected hepatic encephalopathy secondary to her liver disease.  I will send this to her pharmacy.  Stool studies are normal.  Waiting on fecal calprotectin.  CMP looked good except for an elevated glucose.  LFTs just minimally elevated.  Is the colestipol helping?  Recommend follow-up with me in the next 6 to 8 weeks if she does not have an appointment already.

## 2024-10-21 NOTE — TELEPHONE ENCOUNTER
Hub staff attempted to follow warm transfer process and was unsuccessful     Caller: KINZA MAYFIELD    Relationship to patient: SELF    Best call back number:[phone]     Patient is needing: PT IS CALLING MARIANO HODGSON MA BACK PLEASE ADVISE AND CALL PT BACK

## 2024-10-22 ENCOUNTER — TELEPHONE (OUTPATIENT)
Dept: GASTROENTEROLOGY | Facility: CLINIC | Age: 64
End: 2024-10-22
Payer: COMMERCIAL

## 2024-10-22 LAB — CALPROTECTIN STL-MCNT: 35 UG/G (ref 0–120)

## 2024-10-22 NOTE — TELEPHONE ENCOUNTER
Patient was notified of her results and recommendations and verbalized understanding. Patient has a follow up scheduled for January.

## 2024-10-22 NOTE — TELEPHONE ENCOUNTER
Patient was notified that her Xifaxan was approved and verbalized understanding. Patient was notified that it will help with her diarrhea. Patient requested to know if it was long term and was notified that it will be a lifetime medication. Patient verbalized understanding.

## 2024-10-23 RX ORDER — CHOLESTYRAMINE 4 G/9G
4 POWDER, FOR SUSPENSION ORAL DAILY
Qty: 378 G | Refills: 3 | Status: SHIPPED | OUTPATIENT
Start: 2024-10-23

## 2024-10-23 NOTE — TELEPHONE ENCOUNTER
Pt notified of PA approval.   Per pt, she was told to reschedule in 6-8 weeks. Pt scheduled 12/19 per pt request.

## 2024-10-31 ENCOUNTER — HOSPITAL ENCOUNTER (OUTPATIENT)
Dept: ULTRASOUND IMAGING | Facility: HOSPITAL | Age: 64
Discharge: HOME | End: 2024-10-31
Admitting: NURSE PRACTITIONER
Payer: COMMERCIAL

## 2024-10-31 DIAGNOSIS — K75.81 NASH (NONALCOHOLIC STEATOHEPATITIS): ICD-10-CM

## 2024-10-31 DIAGNOSIS — K74.60 CIRRHOSIS OF LIVER WITHOUT ASCITES, UNSPECIFIED HEPATIC CIRRHOSIS TYPE: ICD-10-CM

## 2024-10-31 PROCEDURE — 76705 ECHO EXAM OF ABDOMEN: CPT

## 2024-11-05 ENCOUNTER — TELEPHONE (OUTPATIENT)
Dept: GASTROENTEROLOGY | Facility: CLINIC | Age: 64
End: 2024-11-05
Payer: COMMERCIAL

## 2024-11-05 NOTE — TELEPHONE ENCOUNTER
----- Message from Mirela Barakat sent at 11/4/2024 10:10 AM EST -----  Ultrasound looks stable.  No new lesions noted.

## 2024-11-06 ENCOUNTER — PATIENT MESSAGE (OUTPATIENT)
Dept: GASTROENTEROLOGY | Facility: CLINIC | Age: 64
End: 2024-11-06
Payer: COMMERCIAL

## 2024-12-04 ENCOUNTER — OFFICE VISIT (OUTPATIENT)
Dept: INTERNAL MEDICINE | Age: 64
End: 2024-12-04
Payer: COMMERCIAL

## 2024-12-04 ENCOUNTER — LAB (OUTPATIENT)
Facility: HOSPITAL | Age: 64
End: 2024-12-04
Payer: COMMERCIAL

## 2024-12-04 VITALS
WEIGHT: 182 LBS | DIASTOLIC BLOOD PRESSURE: 90 MMHG | SYSTOLIC BLOOD PRESSURE: 150 MMHG | TEMPERATURE: 98 F | OXYGEN SATURATION: 98 % | BODY MASS INDEX: 33.49 KG/M2 | HEART RATE: 78 BPM | HEIGHT: 62 IN

## 2024-12-04 DIAGNOSIS — E55.9 VITAMIN D DEFICIENCY: ICD-10-CM

## 2024-12-04 DIAGNOSIS — E78.5 HYPERLIPIDEMIA, UNSPECIFIED HYPERLIPIDEMIA TYPE: Chronic | ICD-10-CM

## 2024-12-04 DIAGNOSIS — L29.9 ITCHING OF EAR: ICD-10-CM

## 2024-12-04 DIAGNOSIS — E11.65 TYPE 2 DIABETES MELLITUS WITH HYPERGLYCEMIA, WITH LONG-TERM CURRENT USE OF INSULIN: Primary | ICD-10-CM

## 2024-12-04 DIAGNOSIS — Z23 NEED FOR STREPTOCOCCUS PNEUMONIAE VACCINATION: ICD-10-CM

## 2024-12-04 DIAGNOSIS — F32.A DEPRESSION, UNSPECIFIED DEPRESSION TYPE: Chronic | ICD-10-CM

## 2024-12-04 DIAGNOSIS — Z79.4 TYPE 2 DIABETES MELLITUS WITH HYPERGLYCEMIA, WITH LONG-TERM CURRENT USE OF INSULIN: Primary | ICD-10-CM

## 2024-12-04 DIAGNOSIS — Z76.89 ENCOUNTER TO ESTABLISH CARE: ICD-10-CM

## 2024-12-04 DIAGNOSIS — E11.65 TYPE 2 DIABETES MELLITUS WITH HYPERGLYCEMIA, WITH LONG-TERM CURRENT USE OF INSULIN: Chronic | ICD-10-CM

## 2024-12-04 DIAGNOSIS — Z23 NEED FOR SHINGLES VACCINE: ICD-10-CM

## 2024-12-04 DIAGNOSIS — I10 ESSENTIAL HYPERTENSION: Chronic | ICD-10-CM

## 2024-12-04 DIAGNOSIS — Z79.4 TYPE 2 DIABETES MELLITUS WITH HYPERGLYCEMIA, WITH LONG-TERM CURRENT USE OF INSULIN: Chronic | ICD-10-CM

## 2024-12-04 LAB
ALBUMIN SERPL-MCNC: 4 G/DL (ref 3.5–5.2)
ALBUMIN/GLOB SERPL: 1.3 G/DL
ALP SERPL-CCNC: 113 U/L (ref 39–117)
ALT SERPL W P-5'-P-CCNC: 26 U/L (ref 1–33)
ANION GAP SERPL CALCULATED.3IONS-SCNC: 10.4 MMOL/L (ref 5–15)
AST SERPL-CCNC: 42 U/L (ref 1–32)
BILIRUB SERPL-MCNC: 2.3 MG/DL (ref 0–1.2)
BUN SERPL-MCNC: 10 MG/DL (ref 8–23)
BUN/CREAT SERPL: 13 (ref 7–25)
CALCIUM SPEC-SCNC: 9.3 MG/DL (ref 8.6–10.5)
CHLORIDE SERPL-SCNC: 103 MMOL/L (ref 98–107)
CO2 SERPL-SCNC: 22.6 MMOL/L (ref 22–29)
CREAT SERPL-MCNC: 0.77 MG/DL (ref 0.57–1)
EGFRCR SERPLBLD CKD-EPI 2021: 87.3 ML/MIN/1.73
GLOBULIN UR ELPH-MCNC: 3.2 GM/DL
GLUCOSE SERPL-MCNC: 166 MG/DL (ref 65–99)
HBA1C MFR BLD: 8.5 % (ref 4.8–5.6)
POTASSIUM SERPL-SCNC: 3.8 MMOL/L (ref 3.5–5.2)
PROT SERPL-MCNC: 7.2 G/DL (ref 6–8.5)
SODIUM SERPL-SCNC: 136 MMOL/L (ref 136–145)

## 2024-12-04 PROCEDURE — 90750 HZV VACC RECOMBINANT IM: CPT | Performed by: NURSE PRACTITIONER

## 2024-12-04 PROCEDURE — 80053 COMPREHEN METABOLIC PANEL: CPT

## 2024-12-04 PROCEDURE — 99214 OFFICE O/P EST MOD 30 MIN: CPT | Performed by: NURSE PRACTITIONER

## 2024-12-04 PROCEDURE — 83036 HEMOGLOBIN GLYCOSYLATED A1C: CPT

## 2024-12-04 PROCEDURE — 90472 IMMUNIZATION ADMIN EACH ADD: CPT | Performed by: NURSE PRACTITIONER

## 2024-12-04 PROCEDURE — 90677 PCV20 VACCINE IM: CPT | Performed by: NURSE PRACTITIONER

## 2024-12-04 PROCEDURE — 90471 IMMUNIZATION ADMIN: CPT | Performed by: NURSE PRACTITIONER

## 2024-12-04 PROCEDURE — 36415 COLL VENOUS BLD VENIPUNCTURE: CPT

## 2024-12-04 RX ORDER — INSULIN LISPRO 100 [IU]/ML
INJECTION, SOLUTION INTRAVENOUS; SUBCUTANEOUS
Qty: 3 ML | Refills: 5 | Status: SHIPPED | OUTPATIENT
Start: 2024-12-04

## 2024-12-04 RX ORDER — INSULIN GLARGINE 100 [IU]/ML
10 INJECTION, SOLUTION SUBCUTANEOUS NIGHTLY
Qty: 3 ML | Refills: 5 | Status: SHIPPED | OUTPATIENT
Start: 2024-12-04

## 2024-12-04 RX ORDER — CIPROFLOXACIN AND DEXAMETHASONE 3; 1 MG/ML; MG/ML
4 SUSPENSION/ DROPS AURICULAR (OTIC) 2 TIMES DAILY
Qty: 7.5 ML | Refills: 0 | Status: SHIPPED | OUTPATIENT
Start: 2024-12-04 | End: 2024-12-11

## 2024-12-04 RX ORDER — SIMVASTATIN 10 MG
10 TABLET ORAL NIGHTLY
Qty: 90 TABLET | Refills: 1 | Status: SHIPPED | OUTPATIENT
Start: 2024-12-04

## 2024-12-04 RX ORDER — ERGOCALCIFEROL 1.25 MG/1
50000 CAPSULE, LIQUID FILLED ORAL
Qty: 13 CAPSULE | Refills: 1 | Status: SHIPPED | OUTPATIENT
Start: 2024-12-04

## 2024-12-04 RX ORDER — ESCITALOPRAM OXALATE 20 MG/1
20 TABLET ORAL DAILY
Qty: 90 TABLET | Refills: 1 | Status: SHIPPED | OUTPATIENT
Start: 2024-12-04

## 2024-12-04 RX ORDER — ACYCLOVIR 400 MG/1
1 TABLET ORAL
Qty: 1 EACH | Refills: 5 | Status: SHIPPED | OUTPATIENT
Start: 2024-12-04

## 2024-12-04 RX ORDER — SIMVASTATIN 5 MG
10 TABLET ORAL NIGHTLY
Qty: 90 TABLET | Refills: 0 | Status: CANCELLED | OUTPATIENT
Start: 2024-12-04

## 2024-12-04 RX ORDER — CARVEDILOL 12.5 MG/1
12.5 TABLET ORAL 2 TIMES DAILY WITH MEALS
Qty: 180 TABLET | Refills: 1 | Status: SHIPPED | OUTPATIENT
Start: 2024-12-04

## 2024-12-04 RX ORDER — EZETIMIBE 10 MG/1
10 TABLET ORAL DAILY
Qty: 90 TABLET | Refills: 1 | Status: SHIPPED | OUTPATIENT
Start: 2024-12-04

## 2024-12-04 RX ORDER — LISINOPRIL 20 MG/1
20 TABLET ORAL DAILY
Qty: 90 TABLET | Refills: 1 | Status: SHIPPED | OUTPATIENT
Start: 2024-12-04

## 2024-12-04 NOTE — PROGRESS NOTES
Chief Complaint  Establish Care (The patient is coming in to establish care. The patient states that she has been having dried blood from the nose every morning. )    Subjective      History of Present Illness  The patient is a 64-year-old female who presents for a new patient exam for multiple medical conditions, including diabetes, hypertension, hyperlipidemia, and others. She is seeking to establish care as a new patient and requires medication refills.    She has a history of type 2 diabetes, which she believes is the primary cause of her health issues. She is currently on insulin therapy, specifically Lantus, which she administers at night if her blood sugar levels are high. Initially, she was prescribed 20 units of Lantus, but this was reduced to 10 units. She also takes Humalog 30 minutes before meals if her blood sugar exceeds 150. Her highest recorded A1c level was 14.9, but it has since decreased to 5.9. However, a recent steroid injection in her knee caused her A1c to rise to 6.9. She uses a Dexcom 7 continuous glucose monitor and changes the sensor every 10 days. She reports no history of endocrine or thyroid cancers.     She monitors her blood pressure at work, which typically reads around 120/60. She is on carvedilol 12.5 mg twice daily and lisinopril 20 mg once daily for hypertension.    For high cholesterol, she takes simvastatin 5 mg (two tablets) and Zetia 10 mg. Her last cholesterol check was in June 2023.    She is on Lexapro 20 mg daily for depression, which is well-managed.    She has a vitamin D deficiency and has been on a high-dose weekly vitamin D regimen for an extended period. Blood work done in September 2023 revealed elevated ammonia levels. She has not had her B12 levels checked. She reports no thyroid issues.    She has a rash on her back, which she initially thought was shingles. She was referred to a dermatologist and given an ointment, but it did not alleviate the itching. She has  an upcoming appointment with the dermatologist.    She experiences itching in her ears and nosebleeds in the morning, which she attributes to allergies. She does not use Flonase.    She has had her gallbladder removed and takes Zofran and Pepcid for reflux.    FAMILY HISTORY  She denies any family history of endocrine or thyroid cancers.    IMMUNIZATIONS  She had influenza vaccine in 10/2023 and 4 COVID-19 vaccines.       Past Medical History:   Diagnosis Date    Acid reflux     Cirrhosis     Colitis     Diabetes     Endometriosis     Esophageal varices     Gastroparesis     Hiatal hernia     Hyperlipidemia     Hypertension     Intraductal papilloma of breast, right     Non-alcoholic fatty liver disease         Past Surgical History:   Procedure Laterality Date    CHOLECYSTECTOMY  12/04/2006    COLON RESECTION  12/04/1998    [facility] in [place]    COLONOSCOPY      HYSTERECTOMY      TONSILLECTOMY  12/13/1972    UPPER GASTROINTESTINAL ENDOSCOPY      [facility]        Social History     Tobacco Use   Smoking Status Never    Passive exposure: Never   Smokeless Tobacco Never        Patient Care Team:  Julienne Galvan APRN as PCP - General (Nurse Practitioner)    Allergies   Allergen Reactions    Penicillins Rash     As a Child             Current Outpatient Medications:     brimonidine-timolol (COMBIGAN) 0.2-0.5 % ophthalmic solution, INSTILL ONE DROP INTO BOTH EYES TWICE DAILY, Disp: , Rfl:     carvedilol (COREG) 12.5 MG tablet, Take 1 tablet by mouth 2 (Two) Times a Day With Meals., Disp: 180 tablet, Rfl: 1    cetirizine (ZyrTEC Allergy) 10 MG tablet, Zyrtec 10 mg oral tablet take 1 tablet (10 mg) by oral route once daily   Active, Disp: , Rfl:     cholestyramine (QUESTRAN) 4 GM/DOSE powder, Take 1 packet by mouth Daily. mixed with a liquid, Disp: 378 g, Rfl: 3    colestipol (COLESTID) 1 g tablet, Take 1 tablet by mouth 2 (Two) Times a Day., Disp: 30 tablet, Rfl: 3    Continuous Glucose Sensor (Dexcom  G7 Sensor) misc, Use 1 each Every 10 (Ten) Days., Disp: 1 each, Rfl: 5    diclofenac (VOLTAREN) 75 MG EC tablet, , Disp: , Rfl:     escitalopram (LEXAPRO) 20 MG tablet, Take 1 tablet by mouth Daily., Disp: 90 tablet, Rfl: 1    ezetimibe (ZETIA) 10 MG tablet, Take 1 tablet by mouth Daily., Disp: 90 tablet, Rfl: 1    famotidine (PEPCID) 40 MG tablet, , Disp: , Rfl:     insulin glargine (LANTUS, SEMGLEE) 100 UNIT/ML injection, Inject 10 Units under the skin into the appropriate area as directed Every Night. For blood sugar greater than 150., Disp: 3 mL, Rfl: 5    Insulin Lispro (humaLOG) 100 UNIT/ML injection, Take Humalog insulin to correct glucose levels over 150 mg/dl as follows:  (Add to mealtime insulin if ordered)  If blood glucose is less than 150 mg/dl - 0 units If blood glucose is between 151 and 175 - 2 units If blood glucose is between 176 and 200 - 3 units If blood glucose is between 201 and 225 - 4 units If blood glucose is between 226 and 250 - 5 units If blood glucose is between 251 and 275 - 6 units If blood glucose is between 276 and 300 - 7 units If blood glucose is between 301 and 325 - 8 units If blood glucose is between 326 and 350 - 9 units If blood glucose is 351 or above - 10 units, Disp: 3 mL, Rfl: 5    lisinopril (PRINIVIL,ZESTRIL) 20 MG tablet, Take 1 tablet by mouth Daily., Disp: 90 tablet, Rfl: 1    ondansetron (ZOFRAN) 4 MG tablet, Take 1 tablet by mouth Every 8 (Eight) Hours As Needed for Nausea or Vomiting., Disp: 30 tablet, Rfl: 2    riFAXIMin (Xifaxan) 550 MG tablet, Take 1 tablet by mouth Every 12 (Twelve) Hours., Disp: 180 tablet, Rfl: 3    vitamin D (ERGOCALCIFEROL) 1.25 MG (12181 UT) capsule capsule, Take 1 capsule by mouth Every 7 (Seven) Days., Disp: 13 capsule, Rfl: 1    ciprofloxacin-dexAMETHasone (Ciprodex) 0.3-0.1 % otic suspension, Administer 4 drops into both ears 2 (Two) Times a Day for 7 days. Instill into the affected ear., Disp: 7.5 mL, Rfl: 0    simvastatin (Zocor) 10  "MG tablet, Take 1 tablet by mouth Every Night., Disp: 90 tablet, Rfl: 1    Tirzepatide 2.5 MG/0.5ML solution auto-injector, Inject 2.5 mg under the skin into the appropriate area as directed 1 (One) Time Per Week., Disp: 2 mL, Rfl: 0    Tirzepatide 5 MG/0.5ML solution auto-injector, Inject 5 mg under the skin into the appropriate area as directed 1 (One) Time Per Week., Disp: 2 mL, Rfl: 5    Objective     Vitals:    12/04/24 1126   BP: 150/90   BP Location: Right arm   Patient Position: Sitting   Cuff Size: Large Adult   Pulse: 78   Temp: 98 °F (36.7 °C)   TempSrc: Temporal   SpO2: 98%   Weight: 82.6 kg (182 lb)   Height: 157.5 cm (62\")   PainSc: 0-No pain        Wt Readings from Last 3 Encounters:   12/04/24 82.6 kg (182 lb)   10/14/24 81.6 kg (180 lb)   03/14/23 79.2 kg (174 lb 9.6 oz)        BP Readings from Last 3 Encounters:   12/04/24 150/90   10/14/24 168/67   05/18/21 145/75        Physical Exam  Vital Signs  Blood pressure reading is 150/90.    Physical Exam  Vitals reviewed.   Constitutional:       General: She is not in acute distress.  HENT:      Head: Normocephalic and atraumatic.      Right Ear: Tympanic membrane, ear canal and external ear normal.      Left Ear: Tympanic membrane, ear canal and external ear normal.   Cardiovascular:      Rate and Rhythm: Normal rate and regular rhythm.   Pulmonary:      Effort: Pulmonary effort is normal.      Breath sounds: Normal breath sounds. No wheezing, rhonchi or rales.   Musculoskeletal:      Right lower leg: No edema.      Left lower leg: No edema.   Skin:     General: Skin is warm and dry.      Findings: Rash present.      Comments: Erythematous patch to back noted.   Neurological:      General: No focal deficit present.      Mental Status: She is alert.   Psychiatric:         Thought Content: Thought content normal.                Result Review   The following data was reviewed by: ALBA Carvalho on 12/04/2024:  [x]  Tests & Results  []  " Hospitalization/Emergency Department/Urgent Care  [x]  Internal/External Consultant Notes       Assessment and Plan     Diagnoses and all orders for this visit:    1. Type 2 diabetes mellitus with hyperglycemia, with long-term current use of insulin (Primary)  -     Comprehensive Metabolic Panel; Future  -     CBC & Differential; Future  -     Hemoglobin A1c; Future  -     Vitamin B12; Future  -     Folate; Future  -     Hemoglobin A1c; Future  -     Comprehensive Metabolic Panel; Future    2. Essential hypertension  -     Comprehensive Metabolic Panel; Future  -     TSH Rfx On Abnormal To Free T4; Future  -     Vitamin B12; Future  -     Folate; Future    3. Hyperlipidemia, unspecified hyperlipidemia type  -     Comprehensive Metabolic Panel; Future  -     Lipid Panel; Future    4. Depression, unspecified depression type    5. Vitamin D deficiency  -     Vitamin D,25-Hydroxy; Future    6. Itching of ear    7. Need for shingles vaccine  -     Shingrix Vaccine    8. Need for Streptococcus pneumoniae vaccination  -     Pneumococcal Conjugate Vaccine 20-Valent All    9. Encounter to establish care    Other orders  -     carvedilol (COREG) 12.5 MG tablet; Take 1 tablet by mouth 2 (Two) Times a Day With Meals.  Dispense: 180 tablet; Refill: 1  -     Continuous Glucose Sensor (Dexcom G7 Sensor) misc; Use 1 each Every 10 (Ten) Days.  Dispense: 1 each; Refill: 5  -     escitalopram (LEXAPRO) 20 MG tablet; Take 1 tablet by mouth Daily.  Dispense: 90 tablet; Refill: 1  -     ezetimibe (ZETIA) 10 MG tablet; Take 1 tablet by mouth Daily.  Dispense: 90 tablet; Refill: 1  -     insulin glargine (LANTUS, SEMGLEE) 100 UNIT/ML injection; Inject 10 Units under the skin into the appropriate area as directed Every Night. For blood sugar greater than 150.  Dispense: 3 mL; Refill: 5  -     Insulin Lispro (humaLOG) 100 UNIT/ML injection; Take Humalog insulin to correct glucose levels over 150 mg/dl as follows:   (Add to mealtime insulin  if ordered)   If blood glucose is less than 150 mg/dl - 0 units  If blood glucose is between 151 and 175 - 2 units  If blood glucose is between 176 and 200 - 3 units  If blood glucose is between 201 and 225 - 4 units  If blood glucose is between 226 and 250 - 5 units  If blood glucose is between 251 and 275 - 6 units  If blood glucose is between 276 and 300 - 7 units  If blood glucose is between 301 and 325 - 8 units  If blood glucose is between 326 and 350 - 9 units  If blood glucose is 351 or above - 10 units  Dispense: 3 mL; Refill: 5  -     lisinopril (PRINIVIL,ZESTRIL) 20 MG tablet; Take 1 tablet by mouth Daily.  Dispense: 90 tablet; Refill: 1  -     Tirzepatide 2.5 MG/0.5ML solution auto-injector; Inject 2.5 mg under the skin into the appropriate area as directed 1 (One) Time Per Week.  Dispense: 2 mL; Refill: 0  -     Tirzepatide 5 MG/0.5ML solution auto-injector; Inject 5 mg under the skin into the appropriate area as directed 1 (One) Time Per Week.  Dispense: 2 mL; Refill: 5  -     simvastatin (Zocor) 10 MG tablet; Take 1 tablet by mouth Every Night.  Dispense: 90 tablet; Refill: 1  -     vitamin D (ERGOCALCIFEROL) 1.25 MG (79490 UT) capsule capsule; Take 1 capsule by mouth Every 7 (Seven) Days.  Dispense: 13 capsule; Refill: 1  -     ciprofloxacin-dexAMETHasone (Ciprodex) 0.3-0.1 % otic suspension; Administer 4 drops into both ears 2 (Two) Times a Day for 7 days. Instill into the affected ear.  Dispense: 7.5 mL; Refill: 0         Assessment & Plan  1. Diabetes Mellitus Type II.  Her HbA1c was 6.9, likely elevated due to recent steroid use for knee issues. She is currently on Lantus and Humalog but has run out of both. A prescription for Mounjaro 2.5 mg will be provided, to be taken weekly for 4 weeks before increasing the dose. She is advised to continue with the sliding scale insulin for mealtime management. She is instructed to keep Mounjaro in the refrigerator and take out one pen a week. If she  experiences nausea, vomiting, constipation, or diarrhea, she should notify the office. Blood work will be ordered to check A1c before starting Mounjaro.    2. Hypertension.  Her blood pressure is usually well-controlled at home, running around 120/60. Today, it is 150/90. She is currently taking carvedilol 12.5 mg twice a day and lisinopril 20 mg once a day. Refills for carvedilol and lisinopril will be provided.    3. Hyperlipidemia.  Her cholesterol levels are within normal range, but the LDL level is slightly above the desired range of 100. She is currently taking simvastatin 5 mg 2 tabs but will be switched to simvastatin 10 mg. She is also on Zetia 10 mg.    4. Depression.  She is currently taking Lexapro 20 mg daily for depression, which is well-controlled. No changes to her medication are needed.    5. Vitamin D Deficiency.  She has been noncompliant with her vitamin D supplementation. She will resume high-dose vitamin D once a week. Blood work will be ordered to check vitamin D levels.    6. Itching ears.  She reports morning nasal blood and itchy ears, likely due to allergies or dry heat. She will be prescribed Ciprodex ear drops, to be used twice daily with 4 drops in each ear for one week.    7. Health Maintenance.  She is encouraged to receive the shingles and pneumonia vaccines. She has had her flu shot in October and four COVID-19 shots. She will receive the shingles and pneumonia vaccines today.      BMI is >= 30 and <35. (Class 1 Obesity). The following options were offered after discussion;: exercise counseling/recommendations and nutrition counseling/recommendations       Patient was given instructions and counseling regarding her condition or for health maintenance advice. Please see specific information pulled into the AVS if appropriate.     Follow Up   Return in about 4 months (around 4/4/2025) for Recheck.    Patient or patient representative verbalized consent for the use of Ambient Listening  during the visit with  ALBA Carvalho for chart documentation. 12/4/2024  11:24 EST    ALBA Carvalho

## 2024-12-05 DIAGNOSIS — E78.5 HYPERLIPIDEMIA, UNSPECIFIED HYPERLIPIDEMIA TYPE: ICD-10-CM

## 2024-12-05 DIAGNOSIS — E55.9 VITAMIN D DEFICIENCY: ICD-10-CM

## 2024-12-05 DIAGNOSIS — E11.65 TYPE 2 DIABETES MELLITUS WITH HYPERGLYCEMIA, WITH LONG-TERM CURRENT USE OF INSULIN: Primary | ICD-10-CM

## 2024-12-05 DIAGNOSIS — I10 ESSENTIAL HYPERTENSION: ICD-10-CM

## 2024-12-05 DIAGNOSIS — Z79.4 TYPE 2 DIABETES MELLITUS WITH HYPERGLYCEMIA, WITH LONG-TERM CURRENT USE OF INSULIN: Primary | ICD-10-CM

## 2024-12-13 ENCOUNTER — PATIENT MESSAGE (OUTPATIENT)
Dept: INTERNAL MEDICINE | Age: 64
End: 2024-12-13
Payer: COMMERCIAL

## 2024-12-19 ENCOUNTER — OFFICE VISIT (OUTPATIENT)
Dept: GASTROENTEROLOGY | Facility: CLINIC | Age: 64
End: 2024-12-19
Payer: COMMERCIAL

## 2024-12-19 VITALS
DIASTOLIC BLOOD PRESSURE: 77 MMHG | WEIGHT: 181 LBS | BODY MASS INDEX: 33.31 KG/M2 | SYSTOLIC BLOOD PRESSURE: 158 MMHG | OXYGEN SATURATION: 98 % | HEIGHT: 62 IN | HEART RATE: 71 BPM

## 2024-12-19 DIAGNOSIS — K76.82 HEPATIC ENCEPHALOPATHY: ICD-10-CM

## 2024-12-19 DIAGNOSIS — K74.60 CIRRHOSIS OF LIVER WITHOUT ASCITES, UNSPECIFIED HEPATIC CIRRHOSIS TYPE: Primary | ICD-10-CM

## 2024-12-19 DIAGNOSIS — K75.81 NASH (NONALCOHOLIC STEATOHEPATITIS): ICD-10-CM

## 2024-12-19 DIAGNOSIS — K59.1 FUNCTIONAL DIARRHEA: ICD-10-CM

## 2024-12-19 PROCEDURE — 99214 OFFICE O/P EST MOD 30 MIN: CPT | Performed by: NURSE PRACTITIONER

## 2024-12-19 NOTE — PROGRESS NOTES
Chief Complaint   Diarrhea, Cirrhosis, BELL, and fecal urgency (Some improvement.)    History of Present Illness       Rajwinder An is a 64 y.o. female who presents to [facility] for follow-up for diarrhea. She was last seen in the office by me on 10/14/2024.    History of Bell cirrhosis with type 2 diabetes.  History of esophageal varices. Had previously been seen by Dr. Sidhu at  hepatology.      History diarrhea--admits its been ongoing for years. But now its getting worse. She admits she has been having 3-4 episodes a day. She admits as soon as she eats she has to run to the bathroom. She admits she has tried remedies over the counter without help. She denies any rectal bleeding or melena. She does have some fecal incontinence.         Last EGD/colonoscopy----last EGD 6/22 at . Last colonoscopy was about 3-4 years ago at [facility]. Denies any history colon polyps.      GI family history--- maternal grandmother with liver cancer.     History of cholecystectomy, hysterectomy, tonsillectomy and colon resection---secondary to endometriosis.      Most recent MRCP was done at [facility] on 2/21/2024.  It showed cirrhosis.  No significant hepatic fat deposition.  Spleen enlarged measuring 14.3 cm.  There are bilateral renal cysts largest at the lower right kidney measures 2.6 cm.  No ascites.  There are multiple small cysts scattered throughout the pancreas no significant change from the previous study.  A cyst at the body of the pancreas measures approximately 7 mm.  Previous cholecystectomy.  Subtle early enhancing nodule in the inferior tip of the liver measures approximately 8 mm and is unchanged from the previous study.  This nodule shows no appreciated to have washout and is more subtle than on the previous study.  No definite evidence for hepatocellular carcinoma.  No abnormal enhancement elsewhere.  There are gastrosplenic varices.     She was last seen in the  "[facility] hepatology center on 3/1/2024.  According to their records she has a history of microscopic colitis.  Cyst of her pancreas dating back to February 2023.  Esophageal varices due to cirrhosis of the liver dating back to August 2022.  Diagnosed initially with nonalcoholic cirrhosis in December 2021.  Had abnormal finding on diagnostic imaging of the liver at that same time.     Most recent labs done this past May showed elevated AST at 37.  Alkaline phosphatase was normal.  Total bilirubin normal.  ALT normal.     She does have hx chronic nausea-----will use zofran and pepcid PRN     Denies any confusion, jaundice or pedal edema    Ammonia was elevated at 68.  Stool studies normal. Hemoglobin A1c 8.5.  AST at 42 and total bilirubin at 2.3.  All other LFTs are normal.  Her ultrasound was stable on 10/31/2024.  MELD 10.    She did start xifaxan. Colestid is helping. Doing 1 packet at night. She has changed all her drs to [facility]. She is getting caught up with screenings. Denies any pedal edema or ascites. Denies any confusion. Will be starting MOUNJARO with PCP.   Results       Result Review :       CMP          10/17/2024    09:55 12/4/2024    15:49   CMP   Glucose 250  166    BUN 13  10    Creatinine 0.82  0.77    EGFR 81.0  87.3    Sodium 138  136    Potassium 3.6  3.8    Chloride 105  103    Calcium 9.2  9.3    Total Protein 7.1  7.2    Albumin 4.0  4.0    Globulin 3.1  3.2    Total Bilirubin 1.1  2.3    Alkaline Phosphatase 98  113    AST (SGOT) 37  42    ALT (SGPT) 23  26    Albumin/Globulin Ratio 1.3  1.3    BUN/Creatinine Ratio 15.9  13.0    Anion Gap 7.1  10.4                Lipase No results found for: \"LIPASE\"  Amylase No results found for: \"AMYLASE\"  Iron Profile No results found for: \"IRON\", \"TIBC\", \"LABIRON\", \"TRANSFERRIN\"  Ferritin No results found for: \"FERRITIN\"  ESR (Sed Rate) No results found for: \"SEDRATE\"  CRP (C-Reactive) No results found for: \"CRP\"            Past Medical " History       Past Medical History:   Diagnosis Date    Acid reflux     Cirrhosis     Colitis     Diabetes     Endometriosis     Esophageal varices     Gastroparesis     Hiatal hernia     Hyperlipidemia     Hypertension     Intraductal papilloma of breast, right     Non-alcoholic fatty liver disease        Past Surgical History:   Procedure Laterality Date    CHOLECYSTECTOMY  12/04/2006    [facility]04/[facility] [place]    COLONOSCOPY      HYSTERECTOMY      TONSILLECTOMY  12/13/1972    UPPER GASTROINTESTINAL ENDOSCOPY      [facility]         Current Outpatient Medications:     brimonidine-timolol (COMBIGAN) 0.2-0.5 % ophthalmic solution, INSTILL ONE DROP INTO BOTH EYES TWICE DAILY, Disp: , Rfl:     carvedilol (COREG) 12.5 MG tablet, Take 1 tablet by mouth 2 (Two) Times a Day With Meals., Disp: 180 tablet, Rfl: 1    cetirizine (ZyrTEC Allergy) 10 MG tablet, Zyrtec 10 mg oral tablet take 1 tablet (10 mg) by oral route once daily   Active, Disp: , Rfl:     cholestyramine (QUESTRAN) 4 GM/DOSE powder, Take 1 packet by mouth Daily. mixed with a liquid, Disp: 378 g, Rfl: 3    colestipol (COLESTID) 1 g tablet, Take 1 tablet by mouth 2 (Two) Times a Day., Disp: 30 tablet, Rfl: 3    Continuous Glucose Sensor (Dexcom G7 Sensor) misc, Use 1 each Every 10 (Ten) Days., Disp: 1 each, Rfl: 5    diclofenac (VOLTAREN) 75 MG EC tablet, , Disp: , Rfl:     escitalopram (LEXAPRO) 20 MG tablet, Take 1 tablet by mouth Daily., Disp: 90 tablet, Rfl: 1    ezetimibe (ZETIA) 10 MG tablet, Take 1 tablet by mouth Daily., Disp: 90 tablet, Rfl: 1    famotidine (PEPCID) 40 MG tablet, , Disp: , Rfl:     insulin glargine (LANTUS, SEMGLEE) 100 UNIT/ML injection, Inject 10 Units under the skin into the appropriate area as directed Every Night. For blood sugar greater than 150., Disp: 3 mL, Rfl: 5    Insulin Lispro (humaLOG) 100 UNIT/ML injection, Take Humalog insulin to correct glucose levels over 150 mg/dl as follows:   (Add to mealtime insulin if ordered)  If blood glucose is less than 150 mg/dl - 0 units If blood glucose is between 151 and 175 - 2 units If blood glucose is between 176 and 200 - 3 units If blood glucose is between 201 and 225 - 4 units If blood glucose is between 226 and 250 - 5 units If blood glucose is between 251 and 275 - 6 units If blood glucose is between 276 and 300 - 7 units If blood glucose is between 301 and 325 - 8 units If blood glucose is between 326 and 350 - 9 units If blood glucose is 351 or above - 10 units, Disp: 3 mL, Rfl: 5    lisinopril (PRINIVIL,ZESTRIL) 20 MG tablet, Take 1 tablet by mouth Daily., Disp: 90 tablet, Rfl: 1    ondansetron (ZOFRAN) 4 MG tablet, Take 1 tablet by mouth Every 8 (Eight) Hours As Needed for Nausea or Vomiting., Disp: 30 tablet, Rfl: 2    riFAXIMin (Xifaxan) 550 MG tablet, Take 1 tablet by mouth Every 12 (Twelve) Hours., Disp: 180 tablet, Rfl: 3    simvastatin (Zocor) 10 MG tablet, Take 1 tablet by mouth Every Night., Disp: 90 tablet, Rfl: 1    Tirzepatide 2.5 MG/0.5ML solution auto-injector, Inject 2.5 mg under the skin into the appropriate area as directed 1 (One) Time Per Week., Disp: 2 mL, Rfl: 0    vitamin D (ERGOCALCIFEROL) 1.25 MG (08195 UT) capsule capsule, Take 1 capsule by mouth Every 7 (Seven) Days., Disp: 13 capsule, Rfl: 1    Tirzepatide 5 MG/0.5ML solution auto-injector, Inject 5 mg under the skin into the appropriate area as directed 1 (One) Time Per Week. (Patient not taking: Reported on 12/19/2024), Disp: 2 mL, Rfl: 5     Allergies   Allergen Reactions    Penicillins Rash     As a Child          Family History   Problem Relation Age of Onset    Heart disease Mother     Hypertension Mother     Hypertension Father     Heart disease Father     Diabetes Maternal Aunt     Diabetes Maternal Uncle     Colon cancer Neg Hx         Social History     Social History Narrative    Not on file       Objective       Review of Systems   Constitutional:  Negative for  "appetite change, fatigue, fever, unexpected weight gain and unexpected weight loss.   HENT:  Negative for trouble swallowing.    Respiratory:  Negative for cough, choking, chest tightness, shortness of breath, wheezing and stridor.    Cardiovascular:  Negative for chest pain, palpitations and leg swelling.   Gastrointestinal:  Negative for abdominal distention, abdominal pain, anal bleeding, blood in stool, constipation, diarrhea, nausea, rectal pain, vomiting, GERD and indigestion.        Vital Signs:   /77 (BP Location: Right arm, Patient Position: Sitting)   Pulse 71   Ht 157.5 cm (62\")   Wt 82.1 kg (181 lb)   SpO2 98%   BMI 33.11 kg/m²       Physical Exam  Constitutional:       General: She is not in acute distress.     Appearance: She is well-developed. She is not ill-appearing.   HENT:      Head: Normocephalic.   Eyes:      General: No scleral icterus.     Pupils: Pupils are equal, round, and reactive to light.   Cardiovascular:      Rate and Rhythm: Normal rate and regular rhythm.      Heart sounds: Normal heart sounds.   Pulmonary:      Effort: Pulmonary effort is normal.      Breath sounds: Normal breath sounds.   Abdominal:      General: Bowel sounds are normal. There is no distension.      Palpations: Abdomen is soft. There is no mass.      Tenderness: There is no abdominal tenderness. There is no guarding or rebound.      Hernia: No hernia is present.   Musculoskeletal:         General: Normal range of motion.   Skin:     General: Skin is warm and dry.      Coloration: Skin is not jaundiced.   Neurological:      Mental Status: She is alert and oriented to person, place, and time.   Psychiatric:         Speech: Speech normal.         Behavior: Behavior normal.         Judgment: Judgment normal.           Assessment & Plan          Assessment and Plan    Diagnoses and all orders for this visit:    1. Cirrhosis of liver without ascites, unspecified hepatic cirrhosis type (Primary)  -     " Comprehensive Metabolic Panel; Future  -     Protime-INR; Future  -     Ammonia; Future    2. BELL (nonalcoholic steatohepatitis)  -     Comprehensive Metabolic Panel; Future  -     Protime-INR; Future  -     Ammonia; Future    3. Hepatic encephalopathy  -     Comprehensive Metabolic Panel; Future  -     Protime-INR; Future  -     Ammonia; Future    4. Functional diarrhea    Cirrhosis: BELL, DM MELD 10  Ascites: none.   Varices: esophageal & gastric varices. She is due for surveillance EGD.   Encephalopathy : ammonia at 68. On xifaxan.   Health maintenance: Due for screening colonoscopy next year. Will plan to do EGD at that time.       Reviewed most recent lab and imaging results with her today.  Stool studies were normal.  Sounds like diarrhea is definitely better on colestipol.  Okay to increase it as needed.  Okay to use Imodium OTC as needed.  Ammonia level was elevated.  He did start Xifaxan twice daily.  We will repeat labs before next visit.  LFTs were slightly elevated but stable for her.  MELD score is a 10.  No pedal edema or ascites noted today.  No confusion.  Continue 2 g sodium diet.  Patient to continue to work hard on her diabetes.  Most recent A1c was elevated at 8.5.  Liver ultrasound was stable.  Patient to call the office with any issues.  Patient to follow-up with me in 3 months.  Patient is agreeable to the plan.          Follow Up       Follow Up   Return in about 3 months (around 3/19/2025) for CIRRHOSIS, ELEVATED LIVER ENZYMES.  Patient was given instructions and counseling regarding her condition or for health maintenance advice. Please see specific information pulled into the AVS if appropriate.

## 2024-12-30 DIAGNOSIS — Z87.19 HISTORY OF CIRRHOSIS: Primary | ICD-10-CM

## 2024-12-30 DIAGNOSIS — L29.9 PRURITIC CONDITION: ICD-10-CM

## 2024-12-31 ENCOUNTER — LAB (OUTPATIENT)
Facility: HOSPITAL | Age: 64
End: 2024-12-31
Payer: COMMERCIAL

## 2024-12-31 DIAGNOSIS — K76.82 HEPATIC ENCEPHALOPATHY: ICD-10-CM

## 2024-12-31 DIAGNOSIS — Z87.19 HISTORY OF CIRRHOSIS: ICD-10-CM

## 2024-12-31 DIAGNOSIS — K75.81 NASH (NONALCOHOLIC STEATOHEPATITIS): ICD-10-CM

## 2024-12-31 DIAGNOSIS — K74.60 CIRRHOSIS OF LIVER WITHOUT ASCITES, UNSPECIFIED HEPATIC CIRRHOSIS TYPE: ICD-10-CM

## 2024-12-31 DIAGNOSIS — L29.9 PRURITIC CONDITION: ICD-10-CM

## 2024-12-31 DIAGNOSIS — Z79.4 TYPE 2 DIABETES MELLITUS WITH HYPERGLYCEMIA, WITH LONG-TERM CURRENT USE OF INSULIN: ICD-10-CM

## 2024-12-31 DIAGNOSIS — E11.65 TYPE 2 DIABETES MELLITUS WITH HYPERGLYCEMIA, WITH LONG-TERM CURRENT USE OF INSULIN: ICD-10-CM

## 2024-12-31 LAB
ALBUMIN SERPL-MCNC: 4.1 G/DL (ref 3.5–5.2)
ALBUMIN/GLOB SERPL: 1.2 G/DL
ALP SERPL-CCNC: 101 U/L (ref 39–117)
ALT SERPL W P-5'-P-CCNC: 19 U/L (ref 1–33)
ANION GAP SERPL CALCULATED.3IONS-SCNC: 10 MMOL/L (ref 5–15)
AST SERPL-CCNC: 34 U/L (ref 1–32)
BILIRUB SERPL-MCNC: 1.6 MG/DL (ref 0–1.2)
BUN SERPL-MCNC: 7 MG/DL (ref 8–23)
BUN/CREAT SERPL: 8.4 (ref 7–25)
CALCIUM SPEC-SCNC: 9.2 MG/DL (ref 8.6–10.5)
CHLORIDE SERPL-SCNC: 104 MMOL/L (ref 98–107)
CO2 SERPL-SCNC: 25 MMOL/L (ref 22–29)
CREAT SERPL-MCNC: 0.83 MG/DL (ref 0.57–1)
EGFRCR SERPLBLD CKD-EPI 2021: 79.8 ML/MIN/1.73
FOLATE SERPL-MCNC: 10.7 NG/ML (ref 4.78–24.2)
GLOBULIN UR ELPH-MCNC: 3.4 GM/DL
GLUCOSE SERPL-MCNC: 128 MG/DL (ref 65–99)
POTASSIUM SERPL-SCNC: 3.5 MMOL/L (ref 3.5–5.2)
PROT SERPL-MCNC: 7.5 G/DL (ref 6–8.5)
SODIUM SERPL-SCNC: 139 MMOL/L (ref 136–145)

## 2024-12-31 PROCEDURE — 82746 ASSAY OF FOLIC ACID SERUM: CPT

## 2024-12-31 PROCEDURE — 86381 MITOCHONDRIAL ANTIBODY EACH: CPT

## 2024-12-31 PROCEDURE — 36415 COLL VENOUS BLD VENIPUNCTURE: CPT

## 2024-12-31 PROCEDURE — 80053 COMPREHEN METABOLIC PANEL: CPT

## 2024-12-31 PROCEDURE — 86015 ACTIN ANTIBODY EACH: CPT

## 2025-01-02 LAB
MITOCHONDRIA M2 IGG SER-ACNC: <20 UNITS (ref 0–20)
SMA IGG SER-ACNC: 21 UNITS (ref 0–19)

## 2025-01-07 ENCOUNTER — TELEPHONE (OUTPATIENT)
Dept: GASTROENTEROLOGY | Facility: CLINIC | Age: 65
End: 2025-01-07
Payer: COMMERCIAL

## 2025-01-07 NOTE — TELEPHONE ENCOUNTER
----- Message from Mirela Barakat sent at 1/7/2025  9:59 AM EST -----  Smooth muscle antibody was 2 points above normal.  Still in the weak positive range.  With her alkaline phosphatase being normal no further workup needed at this time.  The other labs look good.

## 2025-02-03 ENCOUNTER — LAB (OUTPATIENT)
Facility: HOSPITAL | Age: 65
End: 2025-02-03
Payer: COMMERCIAL

## 2025-02-03 DIAGNOSIS — K76.82 HEPATIC ENCEPHALOPATHY: ICD-10-CM

## 2025-02-03 DIAGNOSIS — I10 ESSENTIAL HYPERTENSION: ICD-10-CM

## 2025-02-03 DIAGNOSIS — K74.60 CIRRHOSIS OF LIVER WITHOUT ASCITES, UNSPECIFIED HEPATIC CIRRHOSIS TYPE: ICD-10-CM

## 2025-02-03 DIAGNOSIS — K75.81 NASH (NONALCOHOLIC STEATOHEPATITIS): ICD-10-CM

## 2025-02-03 LAB
ALBUMIN SERPL-MCNC: 3.9 G/DL (ref 3.5–5.2)
ALBUMIN/GLOB SERPL: 1.2 G/DL
ALP SERPL-CCNC: 81 U/L (ref 39–117)
ALT SERPL W P-5'-P-CCNC: 19 U/L (ref 1–33)
AMMONIA BLD-SCNC: 34 UMOL/L (ref 11–51)
ANION GAP SERPL CALCULATED.3IONS-SCNC: 11.2 MMOL/L (ref 5–15)
AST SERPL-CCNC: 39 U/L (ref 1–32)
BILIRUB SERPL-MCNC: 1.5 MG/DL (ref 0–1.2)
BUN SERPL-MCNC: 12 MG/DL (ref 8–23)
BUN/CREAT SERPL: 12.5 (ref 7–25)
CALCIUM SPEC-SCNC: 9.1 MG/DL (ref 8.6–10.5)
CHLORIDE SERPL-SCNC: 106 MMOL/L (ref 98–107)
CO2 SERPL-SCNC: 23.8 MMOL/L (ref 22–29)
CREAT SERPL-MCNC: 0.96 MG/DL (ref 0.57–1)
EGFRCR SERPLBLD CKD-EPI 2021: 67 ML/MIN/1.73
GLOBULIN UR ELPH-MCNC: 3.3 GM/DL
GLUCOSE SERPL-MCNC: 120 MG/DL (ref 65–99)
INR PPP: 1.02 (ref 0.86–1.15)
POTASSIUM SERPL-SCNC: 3.7 MMOL/L (ref 3.5–5.2)
PROT SERPL-MCNC: 7.2 G/DL (ref 6–8.5)
PROTHROMBIN TIME: 13.7 SECONDS (ref 11.8–14.9)
SODIUM SERPL-SCNC: 141 MMOL/L (ref 136–145)

## 2025-02-03 PROCEDURE — 82140 ASSAY OF AMMONIA: CPT

## 2025-02-03 PROCEDURE — 36415 COLL VENOUS BLD VENIPUNCTURE: CPT

## 2025-02-03 PROCEDURE — 80053 COMPREHEN METABOLIC PANEL: CPT

## 2025-02-03 PROCEDURE — 85610 PROTHROMBIN TIME: CPT

## 2025-02-11 ENCOUNTER — TELEPHONE (OUTPATIENT)
Dept: GASTROENTEROLOGY | Facility: CLINIC | Age: 65
End: 2025-02-11
Payer: COMMERCIAL

## 2025-02-11 NOTE — TELEPHONE ENCOUNTER
----- Message from Mirela Barakat sent at 2/10/2025  1:09 PM EST -----  Ammonia is much better now at 34.  Pro time normal.

## 2025-03-19 ENCOUNTER — LAB (OUTPATIENT)
Facility: HOSPITAL | Age: 65
End: 2025-03-19
Payer: COMMERCIAL

## 2025-03-19 ENCOUNTER — TELEPHONE (OUTPATIENT)
Dept: OBSTETRICS AND GYNECOLOGY | Facility: CLINIC | Age: 65
End: 2025-03-19
Payer: COMMERCIAL

## 2025-03-19 ENCOUNTER — OFFICE VISIT (OUTPATIENT)
Dept: GASTROENTEROLOGY | Facility: CLINIC | Age: 65
End: 2025-03-19
Payer: COMMERCIAL

## 2025-03-19 VITALS
DIASTOLIC BLOOD PRESSURE: 77 MMHG | BODY MASS INDEX: 30.22 KG/M2 | SYSTOLIC BLOOD PRESSURE: 159 MMHG | HEIGHT: 62 IN | HEART RATE: 80 BPM | WEIGHT: 164.2 LBS

## 2025-03-19 DIAGNOSIS — K59.1 FUNCTIONAL DIARRHEA: ICD-10-CM

## 2025-03-19 DIAGNOSIS — Z79.4 TYPE 2 DIABETES MELLITUS WITH HYPERGLYCEMIA, WITH LONG-TERM CURRENT USE OF INSULIN: ICD-10-CM

## 2025-03-19 DIAGNOSIS — E55.9 VITAMIN D DEFICIENCY: ICD-10-CM

## 2025-03-19 DIAGNOSIS — I10 ESSENTIAL HYPERTENSION: ICD-10-CM

## 2025-03-19 DIAGNOSIS — E11.65 TYPE 2 DIABETES MELLITUS WITH HYPERGLYCEMIA, WITH LONG-TERM CURRENT USE OF INSULIN: ICD-10-CM

## 2025-03-19 DIAGNOSIS — I85.00 ESOPHAGEAL VARICES WITHOUT BLEEDING, UNSPECIFIED ESOPHAGEAL VARICES TYPE: ICD-10-CM

## 2025-03-19 DIAGNOSIS — E78.5 HYPERLIPIDEMIA, UNSPECIFIED HYPERLIPIDEMIA TYPE: ICD-10-CM

## 2025-03-19 DIAGNOSIS — K74.60 CIRRHOSIS OF LIVER WITHOUT ASCITES, UNSPECIFIED HEPATIC CIRRHOSIS TYPE: Primary | ICD-10-CM

## 2025-03-19 DIAGNOSIS — K76.82 HEPATIC ENCEPHALOPATHY: ICD-10-CM

## 2025-03-19 DIAGNOSIS — Z80.0 FH: COLON CANCER: ICD-10-CM

## 2025-03-19 DIAGNOSIS — Z86.0100 HISTORY OF COLON POLYPS: ICD-10-CM

## 2025-03-19 LAB
25(OH)D3 SERPL-MCNC: 15.8 NG/ML (ref 30–100)
BASOPHILS # BLD AUTO: 0.03 10*3/MM3 (ref 0–0.2)
BASOPHILS NFR BLD AUTO: 0.8 % (ref 0–1.5)
CHOLEST SERPL-MCNC: 193 MG/DL (ref 0–200)
DEPRECATED RDW RBC AUTO: 50.3 FL (ref 37–54)
EOSINOPHIL # BLD AUTO: 0.18 10*3/MM3 (ref 0–0.4)
EOSINOPHIL NFR BLD AUTO: 5 % (ref 0.3–6.2)
ERYTHROCYTE [DISTWIDTH] IN BLOOD BY AUTOMATED COUNT: 14.4 % (ref 12.3–15.4)
HBA1C MFR BLD: 5.7 % (ref 4.8–5.6)
HCT VFR BLD AUTO: 39.6 % (ref 34–46.6)
HDLC SERPL-MCNC: 57 MG/DL (ref 40–60)
HGB BLD-MCNC: 13.5 G/DL (ref 12–15.9)
IMM GRANULOCYTES # BLD AUTO: 0.01 10*3/MM3 (ref 0–0.05)
IMM GRANULOCYTES NFR BLD AUTO: 0.3 % (ref 0–0.5)
LDLC SERPL CALC-MCNC: 120 MG/DL (ref 0–100)
LDLC/HDLC SERPL: 2.08 {RATIO}
LYMPHOCYTES # BLD AUTO: 0.7 10*3/MM3 (ref 0.7–3.1)
LYMPHOCYTES NFR BLD AUTO: 19.6 % (ref 19.6–45.3)
MCH RBC QN AUTO: 32.2 PG (ref 26.6–33)
MCHC RBC AUTO-ENTMCNC: 34.1 G/DL (ref 31.5–35.7)
MCV RBC AUTO: 94.5 FL (ref 79–97)
MONOCYTES # BLD AUTO: 0.18 10*3/MM3 (ref 0.1–0.9)
MONOCYTES NFR BLD AUTO: 5 % (ref 5–12)
NEUTROPHILS NFR BLD AUTO: 2.47 10*3/MM3 (ref 1.7–7)
NEUTROPHILS NFR BLD AUTO: 69.3 % (ref 42.7–76)
NRBC BLD AUTO-RTO: 0 /100 WBC (ref 0–0.2)
PLATELET # BLD AUTO: 116 10*3/MM3 (ref 140–450)
PMV BLD AUTO: 12.5 FL (ref 6–12)
RBC # BLD AUTO: 4.19 10*6/MM3 (ref 3.77–5.28)
TRIGL SERPL-MCNC: 88 MG/DL (ref 0–150)
TSH SERPL DL<=0.05 MIU/L-ACNC: 3.93 UIU/ML (ref 0.27–4.2)
VIT B12 BLD-MCNC: 700 PG/ML (ref 211–946)
VLDLC SERPL-MCNC: 16 MG/DL (ref 5–40)
WBC NRBC COR # BLD AUTO: 3.57 10*3/MM3 (ref 3.4–10.8)

## 2025-03-19 PROCEDURE — 83036 HEMOGLOBIN GLYCOSYLATED A1C: CPT

## 2025-03-19 PROCEDURE — 82306 VITAMIN D 25 HYDROXY: CPT

## 2025-03-19 PROCEDURE — 99214 OFFICE O/P EST MOD 30 MIN: CPT | Performed by: NURSE PRACTITIONER

## 2025-03-19 PROCEDURE — 36415 COLL VENOUS BLD VENIPUNCTURE: CPT

## 2025-03-19 PROCEDURE — 85025 COMPLETE CBC W/AUTO DIFF WBC: CPT

## 2025-03-19 PROCEDURE — 84443 ASSAY THYROID STIM HORMONE: CPT

## 2025-03-19 PROCEDURE — 80061 LIPID PANEL: CPT

## 2025-03-19 PROCEDURE — 82607 VITAMIN B-12: CPT

## 2025-03-19 RX ORDER — SODIUM, POTASSIUM,MAG SULFATES 17.5-3.13G
2 SOLUTION, RECONSTITUTED, ORAL ORAL TAKE AS DIRECTED
Qty: 177 ML | Refills: 0 | Status: SHIPPED | OUTPATIENT
Start: 2025-03-19

## 2025-03-19 NOTE — TELEPHONE ENCOUNTER
Patient wants to reschedule the New GYN appointment with Dr. Arnold. Message being sent to end of day to get rescheduled.

## 2025-03-19 NOTE — PROGRESS NOTES
Chief Complaint   Cirrhosis and Diarrhea    History of Present Illness       Rajwinder An is a 64 y.o. female who presents to [facility] for follow-up for cirrhosis. She was last seen in the office by me on 12/19/2024.    History of Vang cirrhosis with type 2 diabetes.  History of esophageal varices. Had previously been seen by Dr. Sidhu at  hepatology.      History diarrhea--admits its been ongoing for years. But now its getting worse. She admits she has been having 3-4 episodes a day. She admits as soon as she eats she has to run to the bathroom. She admits she has tried remedies over the counter without help. She denies any rectal bleeding or melena. She does have some fecal incontinence.         Last EGD/colonoscopy----last EGD 6/22 at . Last colonoscopy was about 3-4 years ago at [facility]. Denies any history colon polyps.      GI family history--- maternal grandmother with liver cancer.     History of cholecystectomy, hysterectomy, tonsillectomy and colon resection---secondary to endometriosis.      Most recent MRCP was done at [facility] on 2/21/2024.  It showed cirrhosis.  No significant hepatic fat deposition.  Spleen enlarged measuring 14.3 cm.  There are bilateral renal cysts largest at the lower right kidney measures 2.6 cm.  No ascites.  There are multiple small cysts scattered throughout the pancreas no significant change from the previous study.  A cyst at the body of the pancreas measures approximately 7 mm.  Previous cholecystectomy.  Subtle early enhancing nodule in the inferior tip of the liver measures approximately 8 mm and is unchanged from the previous study.  This nodule shows no appreciated to have washout and is more subtle than on the previous study.  No definite evidence for hepatocellular carcinoma.  No abnormal enhancement elsewhere.  There are gastrosplenic varices.     She was last seen in the [facility] hepatology  "[place] on 3/1/2024.  According to their records she has a history of microscopic colitis.  Cyst of her pancreas dating back to February 2023.  Esophageal varices due to cirrhosis of the liver dating back to August 2022.  Diagnosed initially with nonalcoholic cirrhosis in December 2021.  Had abnormal finding on diagnostic imaging of the liver at that same time.     Most recent labs done this past May showed elevated AST at 37.  Alkaline phosphatase was normal.  Total bilirubin normal.  ALT normal.     She does have hx chronic nausea-----will use zofran and pepcid PRN     Denies any confusion, jaundice or pedal edema     Ammonia was elevated at 68.  Stool studies normal. Hemoglobin A1c 8.5.  AST at 42 and total bilirubin at 2.3.  All other LFTs are normal.  Her ultrasound was stable on 10/31/2024.      Bowels doing better when she takes cholestyramine BID> but then she was getting constipated. So she is going to try daily cholestyramine. Will use imodium PRN. Having more sulfur burps. Using pepcid daily. Denies any ascites or pedal edema. Denies any confusion. Will have fatigue. Ammonia back to normal at 34. On Mounjaro.      Results       Result Review :       CMP          12/4/2024    15:49 12/31/2024    11:27 2/3/2025    10:23   CMP   Glucose 166  128  120    BUN 10  7  12    Creatinine 0.77  0.83  0.96    EGFR 87.3  79.8  67.0    Sodium 136  139  141    Potassium 3.8  3.5  3.7    Chloride 103  104  106    Calcium 9.3  9.2  9.1    Total Protein 7.2  7.5  7.2    Albumin 4.0  4.1  3.9    Globulin 3.2  3.4  3.3    Total Bilirubin 2.3  1.6  1.5    Alkaline Phosphatase 113  101  81    AST (SGOT) 42  34  39    ALT (SGPT) 26  19  19    Albumin/Globulin Ratio 1.3  1.2  1.2    BUN/Creatinine Ratio 13.0  8.4  12.5    Anion Gap 10.4  10.0  11.2                Lipase No results found for: \"LIPASE\"  Amylase No results found for: \"AMYLASE\"  Iron Profile No results found for: \"IRON\", \"TIBC\", \"LABIRON\", \"TRANSFERRIN\"  Ferritin No " "results found for: \"FERRITIN\"  ESR (Sed Rate) No results found for: \"SEDRATE\"  CRP (C-Reactive) No results found for: \"CRP\"  Liver Workup   A-1 Antitrypsin   Date Value Ref Range Status   01/21/2022 157 83 - 199 mg/dL Final     AFP Tumor Marker   Date Value Ref Range Status   05/19/2023 3.8 ng/mL Final     Comment:     Reference Range:   <6.1  The use of AFP as a tumor marker in pregnant  females is not recommended.      This test was performed using the [employer] [place]  chemiluminescent method. Values obtained from  different assay methods cannot be used  interchangeably. AFP levels, regardless of  value, should not be interpreted as absolute  evidence of the presence or absence of disease.     Smooth Muscle Ab   Date Value Ref Range Status   12/31/2024 21 (H) 0 - 19 Units Final     Comment:                      Negative                     0 - 19                   Weak positive               20 - 30                   Moderate to strong positive     >30   Actin Antibodies are found in 52-85% of patients with   autoimmune hepatitis or chronic active hepatitis and   in 22% of patients with primary biliary cirrhosis.     Ceruloplasmin   Date Value Ref Range Status   01/21/2022 28 18 - 53 mg/dL Final     Mitochondrial Ab   Date Value Ref Range Status   12/31/2024 <20.0 0.0 - 20.0 Units Final     Comment:                                     Negative    0.0 - 20.0                                  Equivocal  20.1 - 24.9                                  Positive         >24.9  Mitochondrial (M2) Antibodies are found in 90-96% of  patients with primary biliary cirrhosis.     Protime   Date Value Ref Range Status   02/03/2025 13.7 11.8 - 14.9 Seconds Final   11/18/2022 10.4 9.0 - 11.5 sec Final     Comment:     For additional information, please refer to  http://[email or web address].Vitryn/faq/XZV619  (This link is being provided for informational/  educational purposes only.)     INR   Date Value Ref Range Status "   02/03/2025 1.02 0.86 - 1.15 Final   05/19/2023 1 0.9 - 1.1 Final     Comment:     Moderate-intensity Warfarin Therapy     2.0-3.0  Higher-intensity Warfarin Therapy         3.0-4.0               Past Medical History       Past Medical History:   Diagnosis Date    Acid reflux     Cirrhosis     Colitis     Diabetes     Endometriosis     Esophageal varices     Gastroparesis     Hiatal hernia     Hyperlipidemia     Hypertension     Intraductal papilloma of breast, right     Knee swelling [id number]    Non-alcoholic fatty liver disease        Past Surgical History:   Procedure Laterality Date    CHOLECYSTECTOMY  12/04/2006    [facility]  [facility] [place]    COLONOSCOPY      HYSTERECTOMY      TONSILLECTOMY  12/13/1972    UPPER GASTROINTESTINAL ENDOSCOPY      [facility]         Current Outpatient Medications:     brimonidine-timolol (COMBIGAN) 0.2-0.5 % ophthalmic solution, INSTILL ONE DROP INTO BOTH EYES TWICE DAILY, Disp: , Rfl:     carvedilol (COREG) 12.5 MG tablet, Take 1 tablet by mouth 2 (Two) Times a Day With Meals., Disp: 180 tablet, Rfl: 1    cetirizine (ZyrTEC Allergy) 10 MG tablet, Zyrtec 10 mg oral tablet take 1 tablet (10 mg) by oral route once daily   Active, Disp: , Rfl:     cholestyramine (QUESTRAN) 4 GM/DOSE powder, Take 1 packet by mouth Daily. mixed with a liquid, Disp: 378 g, Rfl: 3    Continuous Glucose Sensor (Dexcom G7 Sensor) misc, Use 1 each Every 10 (Ten) Days., Disp: 1 each, Rfl: 5    diclofenac (VOLTAREN) 75 MG EC tablet, , Disp: , Rfl:     escitalopram (LEXAPRO) 20 MG tablet, Take 1 tablet by mouth Daily., Disp: 90 tablet, Rfl: 1    ezetimibe (ZETIA) 10 MG tablet, Take 1 tablet by mouth Daily., Disp: 90 tablet, Rfl: 1    famotidine (PEPCID) 40 MG tablet, , Disp: , Rfl:     insulin glargine (LANTUS, SEMGLEE) 100 UNIT/ML injection, Inject 10 Units under the skin into the appropriate area as directed Every Night. For blood sugar greater than 150., Disp: 3 mL,  Rfl: 5    Insulin Lispro (humaLOG) 100 UNIT/ML injection, Take Humalog insulin to correct glucose levels over 150 mg/dl as follows:  (Add to mealtime insulin if ordered)  If blood glucose is less than 150 mg/dl - 0 units If blood glucose is between 151 and 175 - 2 units If blood glucose is between 176 and 200 - 3 units If blood glucose is between 201 and 225 - 4 units If blood glucose is between 226 and 250 - 5 units If blood glucose is between 251 and 275 - 6 units If blood glucose is between 276 and 300 - 7 units If blood glucose is between 301 and 325 - 8 units If blood glucose is between 326 and 350 - 9 units If blood glucose is 351 or above - 10 units, Disp: 3 mL, Rfl: 5    lisinopril (PRINIVIL,ZESTRIL) 20 MG tablet, Take 1 tablet by mouth Daily., Disp: 90 tablet, Rfl: 1    ondansetron (ZOFRAN) 4 MG tablet, Take 1 tablet by mouth Every 8 (Eight) Hours As Needed for Nausea or Vomiting., Disp: 30 tablet, Rfl: 2    riFAXIMin (Xifaxan) 550 MG tablet, Take 1 tablet by mouth Every 12 (Twelve) Hours., Disp: 180 tablet, Rfl: 3    simvastatin (Zocor) 10 MG tablet, Take 1 tablet by mouth Every Night., Disp: 90 tablet, Rfl: 1    Tirzepatide 5 MG/0.5ML solution auto-injector, Inject 5 mg under the skin into the appropriate area as directed 1 (One) Time Per Week., Disp: 2 mL, Rfl: 5    vitamin D (ERGOCALCIFEROL) 1.25 MG (45175 UT) capsule capsule, Take 1 capsule by mouth Every 7 (Seven) Days., Disp: 13 capsule, Rfl: 1    sodium-potassium-magnesium sulfates (Suprep Bowel Prep Kit) 17.5-3.13-1.6 GM/177ML solution oral solution, Take 2 bottles by mouth Take As Directed., Disp: 177 mL, Rfl: 0     Allergies   Allergen Reactions    Penicillins Rash     As a Child          Family History   Problem Relation Age of Onset    Heart disease Mother     Hypertension Mother     Osteoporosis Mother     Hypertension Father     Heart disease Father     Diabetes Maternal Aunt     Diabetes Maternal Uncle     Diabetes Paternal Aunt     Diabetes  "Paternal Uncle     Colon cancer Neg Hx         Social History     Social History Narrative    Not on file       Objective       Review of Systems   Constitutional:  Positive for fatigue. Negative for appetite change, fever, unexpected weight gain and unexpected weight loss.   HENT:  Negative for trouble swallowing.    Respiratory:  Negative for cough, choking, chest tightness, shortness of breath, wheezing and stridor.    Cardiovascular:  Negative for chest pain, palpitations and leg swelling.   Gastrointestinal:  Positive for constipation, diarrhea and indigestion. Negative for abdominal distention, abdominal pain, anal bleeding, blood in stool, nausea, rectal pain, vomiting and GERD.        Vital Signs:   /77 (BP Location: Left arm, Patient Position: Sitting, Cuff Size: Adult)   Pulse 80   Ht 157.5 cm (62\")   Wt 74.5 kg (164 lb 3.2 oz)   BMI 30.03 kg/m²       Physical Exam  Constitutional:       General: She is not in acute distress.     Appearance: She is well-developed. She is not ill-appearing.   HENT:      Head: Normocephalic.   Eyes:      General: No scleral icterus.     Pupils: Pupils are equal, round, and reactive to light.   Cardiovascular:      Rate and Rhythm: Normal rate and regular rhythm.      Heart sounds: Normal heart sounds.   Pulmonary:      Effort: Pulmonary effort is normal.      Breath sounds: Normal breath sounds.   Abdominal:      General: Bowel sounds are normal. There is no distension.      Palpations: Abdomen is soft. There is no mass.      Tenderness: There is no abdominal tenderness. There is no guarding or rebound.      Hernia: No hernia is present.   Musculoskeletal:         General: Normal range of motion.      Right lower leg: No edema.      Left lower leg: No edema.   Skin:     General: Skin is warm and dry.      Coloration: Skin is not jaundiced.   Neurological:      Mental Status: She is alert and oriented to person, place, and time.   Psychiatric:         Speech: Speech " normal.         Behavior: Behavior normal.         Judgment: Judgment normal.           Assessment & Plan          Assessment and Plan    Diagnoses and all orders for this visit:    1. Cirrhosis of liver without ascites, unspecified hepatic cirrhosis type (Primary)  -     Case Request; Standing  -     Follow Anesthesia Guidelines / Protocol; Future  -     Case Request  -     sodium-potassium-magnesium sulfates (Suprep Bowel Prep Kit) 17.5-3.13-1.6 GM/177ML solution oral solution; Take 2 bottles by mouth Take As Directed.  Dispense: 177 mL; Refill: 0    2. Hepatic encephalopathy  -     Case Request; Standing  -     Follow Anesthesia Guidelines / Protocol; Future  -     Case Request  -     sodium-potassium-magnesium sulfates (Suprep Bowel Prep Kit) 17.5-3.13-1.6 GM/177ML solution oral solution; Take 2 bottles by mouth Take As Directed.  Dispense: 177 mL; Refill: 0    3. Esophageal varices without bleeding, unspecified esophageal varices type  -     Case Request; Standing  -     Follow Anesthesia Guidelines / Protocol; Future  -     Case Request  -     sodium-potassium-magnesium sulfates (Suprep Bowel Prep Kit) 17.5-3.13-1.6 GM/177ML solution oral solution; Take 2 bottles by mouth Take As Directed.  Dispense: 177 mL; Refill: 0    4. History of colon polyps  -     Case Request; Standing  -     Follow Anesthesia Guidelines / Protocol; Future  -     Case Request  -     sodium-potassium-magnesium sulfates (Suprep Bowel Prep Kit) 17.5-3.13-1.6 GM/177ML solution oral solution; Take 2 bottles by mouth Take As Directed.  Dispense: 177 mL; Refill: 0    5. FH: colon cancer  -     Case Request; Standing  -     Follow Anesthesia Guidelines / Protocol; Future  -     Case Request  -     sodium-potassium-magnesium sulfates (Suprep Bowel Prep Kit) 17.5-3.13-1.6 GM/177ML solution oral solution; Take 2 bottles by mouth Take As Directed.  Dispense: 177 mL; Refill: 0    6. Functional diarrhea  -     Case Request; Standing  -     Follow  Anesthesia Guidelines / Protocol; Future  -     Case Request  -     sodium-potassium-magnesium sulfates (Suprep Bowel Prep Kit) 17.5-3.13-1.6 GM/177ML solution oral solution; Take 2 bottles by mouth Take As Directed.  Dispense: 177 mL; Refill: 0    Other orders  -     Verify NPO; Standing  -     Verify Bowel Prep Was Successful; Standing  -     Give Tap Water Enema If Bowel Prep Insufficient; Standing      Cirrhosis: BELL, DM MELD 8  Ascites: none.   Varices: esophageal & gastric varices. She is due for surveillance EGD.   Encephalopathy : ammonia normal. On xifaxan.   Health maintenance: Due for screening colonoscopy this year. Will plan to do EGD at that time.      Reviewed most recent lab and imaging results with her today.  New MELD score is 8.  Down from 10.  Ammonia normal.  LFTs look good.  Cholestyramine is definitely helping her diarrhea.  Unfortunately if she takes too much of it it will cause her to be constipated.  Would like her to try balancing the cholestyramine to just once a day and only add in the twice daily dosing on days that she is going to leave the house.  Okay to use Imodium OTC as needed.  Continue a high-protein/low-carb diet.  Continue a protein bedtime snack.  Continue a low-salt diet.  No ascites or pedal edema noted today.  No jaundice.  No confusion.  Still having fatigue.  On Xifaxan twice daily.  She is due for EGD and colonoscopy.  We will get her on the scope schedule for this.  She will have to hold her Mounjaro 1 week prior.  No clearances, no blood thinners.  Suprep.  Patient to call the office with any issues.  Patient to follow-up with me in 3 months.  Patient is agreeable to the plan.    Surgical Risk and Benefits discussed: Possible risks/complications, benefits, and alternatives to surgical or invasive procedure have been explained to patient and/or legal guardian; risks include bleeding, infection, and perforation. Patient has been evaluated and can tolerate anesthesia  and/or sedation. Risks, benefits, and alternatives to anesthesia and sedation have been explained to patient and/or legal guardian.      Follow Up       Follow Up   Return in about 3 months (around 6/19/2025) for CIRRHOSIS, DIARRHEA.  Patient was given instructions and counseling regarding her condition or for health maintenance advice. Please see specific information pulled into the AVS if appropriate.

## 2025-03-21 ENCOUNTER — RESULTS FOLLOW-UP (OUTPATIENT)
Dept: INTERNAL MEDICINE | Age: 65
End: 2025-03-21
Payer: COMMERCIAL

## 2025-03-25 RX ORDER — ERGOCALCIFEROL 1.25 MG/1
50000 CAPSULE, LIQUID FILLED ORAL WEEKLY
Qty: 4 CAPSULE | Refills: 0 | Status: SHIPPED | OUTPATIENT
Start: 2025-03-25

## 2025-03-31 NOTE — PROGRESS NOTES
Chief Complaint  No chief complaint on file.    Subjective      History of Present Illness         Past Medical History:   Diagnosis Date    Acid reflux     Cirrhosis     Colitis     Diabetes     Endometriosis     Esophageal varices     Gastroparesis     Hiatal hernia     Hyperlipidemia     Hypertension     Intraductal papilloma of breast, right     Knee swelling [id number]    Non-alcoholic fatty liver disease         Past Surgical History:   Procedure Laterality Date    CHOLECYSTECTOMY  12/04/2006    COLON RESECTION  12/04/1998    [facility] in [place]    COLONOSCOPY      HYSTERECTOMY      TONSILLECTOMY  12/13/1972    UPPER GASTROINTESTINAL ENDOSCOPY      [facility]        Social History     Tobacco Use   Smoking Status Never    Passive exposure: Never   Smokeless Tobacco Never        Patient Care Team:  Julienne Galvan APRN as PCP - General (Nurse Practitioner)  Mirela Barakat APRN as Nurse Practitioner (Nurse Practitioner)    Allergies   Allergen Reactions    Penicillins Rash     As a Child             Current Outpatient Medications:     brimonidine-timolol (COMBIGAN) 0.2-0.5 % ophthalmic solution, INSTILL ONE DROP INTO BOTH EYES TWICE DAILY, Disp: , Rfl:     carvedilol (COREG) 12.5 MG tablet, Take 1 tablet by mouth 2 (Two) Times a Day With Meals., Disp: 180 tablet, Rfl: 1    cetirizine (ZyrTEC Allergy) 10 MG tablet, Zyrtec 10 mg oral tablet take 1 tablet (10 mg) by oral route once daily   Active, Disp: , Rfl:     cholestyramine (QUESTRAN) 4 GM/DOSE powder, Take 1 packet by mouth Daily. mixed with a liquid, Disp: 378 g, Rfl: 3    Continuous Glucose Sensor (Dexcom G7 Sensor) misc, Use 1 each Every 10 (Ten) Days., Disp: 1 each, Rfl: 5    diclofenac (VOLTAREN) 75 MG EC tablet, , Disp: , Rfl:     escitalopram (LEXAPRO) 20 MG tablet, Take 1 tablet by mouth Daily., Disp: 90 tablet, Rfl: 1    ezetimibe (ZETIA) 10 MG tablet, Take 1 tablet by mouth Daily., Disp: 90 tablet, Rfl: 1    famotidine (PEPCID)  40 MG tablet, , Disp: , Rfl:     insulin glargine (LANTUS, SEMGLEE) 100 UNIT/ML injection, Inject 10 Units under the skin into the appropriate area as directed Every Night. For blood sugar greater than 150., Disp: 3 mL, Rfl: 5    Insulin Lispro (humaLOG) 100 UNIT/ML injection, Take Humalog insulin to correct glucose levels over 150 mg/dl as follows:  (Add to mealtime insulin if ordered)  If blood glucose is less than 150 mg/dl - 0 units If blood glucose is between 151 and 175 - 2 units If blood glucose is between 176 and 200 - 3 units If blood glucose is between 201 and 225 - 4 units If blood glucose is between 226 and 250 - 5 units If blood glucose is between 251 and 275 - 6 units If blood glucose is between 276 and 300 - 7 units If blood glucose is between 301 and 325 - 8 units If blood glucose is between 326 and 350 - 9 units If blood glucose is 351 or above - 10 units, Disp: 3 mL, Rfl: 5    lisinopril (PRINIVIL,ZESTRIL) 20 MG tablet, Take 1 tablet by mouth Daily., Disp: 90 tablet, Rfl: 1    ondansetron (ZOFRAN) 4 MG tablet, Take 1 tablet by mouth Every 8 (Eight) Hours As Needed for Nausea or Vomiting., Disp: 30 tablet, Rfl: 2    riFAXIMin (Xifaxan) 550 MG tablet, Take 1 tablet by mouth Every 12 (Twelve) Hours., Disp: 180 tablet, Rfl: 3    simvastatin (Zocor) 10 MG tablet, Take 1 tablet by mouth Every Night., Disp: 90 tablet, Rfl: 1    sodium-potassium-magnesium sulfates (Suprep Bowel Prep Kit) 17.5-3.13-1.6 GM/177ML solution oral solution, Take 2 bottles by mouth Take As Directed., Disp: 177 mL, Rfl: 0    Tirzepatide 5 MG/0.5ML solution auto-injector, Inject 5 mg under the skin into the appropriate area as directed 1 (One) Time Per Week., Disp: 2 mL, Rfl: 5    vitamin D (ERGOCALCIFEROL) 1.25 MG (25805 UT) capsule capsule, Take 1 capsule by mouth 1 (One) Time Per Week., Disp: 4 capsule, Rfl: 0    Objective     There were no vitals filed for this visit.     Wt Readings from Last 3 Encounters:   03/19/25 74.5 kg  (164 lb 3.2 oz)   12/19/24 82.1 kg (181 lb)   12/04/24 82.6 kg (182 lb)        BP Readings from Last 3 Encounters:   03/19/25 159/77   12/19/24 158/77   12/04/24 150/90        Physical Exam      Physical Exam           Result Review   The following data was reviewed by: ALBA Carvalho on 04/04/2025:  [x]  Tests & Results  []  Hospitalization/Emergency Department/Urgent Care  []  Internal/External Consultant Notes       Assessment and Plan     Diagnoses and all orders for this visit:    1. Type 2 diabetes mellitus with hyperglycemia, with long-term current use of insulin (Primary)    2. Hyperlipidemia, unspecified hyperlipidemia type    3. Essential hypertension    4. Vitamin D deficiency    5. Depression, unspecified depression type         Assessment & Plan    1. Diabetes Mellitus Type II.  Her HbA1c was 8.50,  started on Mounjaro at last visit. Her HbA1c is now 5.70.  She is advised to continue with the sliding scale insulin for mealtime management.      2. Hypertension.  Her blood pressure is usually well-controlled at home, running around 120/60. Today, it is 150/90. She is currently taking carvedilol 12.5 mg twice a day and lisinopril 20 mg once a day. Refills for carvedilol and lisinopril will be provided.     3. Hyperlipidemia.  Her cholesterol levels are within normal range, but the LDL level is slightly above the desired range of 100. She is currently taking simvastatin 5 mg 2 tabs but will be switched to simvastatin 10 mg. She is also on Zetia 10 mg.     4. Depression.  She is currently taking Lexapro 20 mg daily for depression, which is well-controlled. No changes to her medication are needed.     5. Vitamin D Deficiency.  She has been noncompliant with her vitamin D supplementation. She will resume high-dose vitamin D once a week. Blood work will be ordered to check vitamin D levels.     6. Itching ears.  She reports morning nasal blood and itchy ears, likely due to allergies or dry heat. She will  be prescribed Ciprodex ear drops, to be used twice daily with 4 drops in each ear for one week.     7. Health Maintenance.  She is encouraged to receive the shingles and pneumonia vaccines. She has had her flu shot in October and four COVID-19 shots. She will receive the shingles and pneumonia vaccines today.     Patient was given instructions and counseling regarding her condition or for health maintenance advice. Please see specific information pulled into the AVS if appropriate.     Follow Up   No follow-ups on file.    Patient or patient representative verbalized consent for the use of Ambient Listening during the visit with  ALBA Carvalho for chart documentation. 4/8/2025  11:38 EDT    ALBA Carvalho

## 2025-04-04 ENCOUNTER — OFFICE VISIT (OUTPATIENT)
Dept: INTERNAL MEDICINE | Age: 65
End: 2025-04-04
Payer: COMMERCIAL

## 2025-04-04 DIAGNOSIS — F32.A DEPRESSION, UNSPECIFIED DEPRESSION TYPE: ICD-10-CM

## 2025-04-04 DIAGNOSIS — E11.65 TYPE 2 DIABETES MELLITUS WITH HYPERGLYCEMIA, WITH LONG-TERM CURRENT USE OF INSULIN: Primary | ICD-10-CM

## 2025-04-04 DIAGNOSIS — Z79.4 TYPE 2 DIABETES MELLITUS WITH HYPERGLYCEMIA, WITH LONG-TERM CURRENT USE OF INSULIN: Primary | ICD-10-CM

## 2025-04-04 DIAGNOSIS — I10 ESSENTIAL HYPERTENSION: ICD-10-CM

## 2025-04-04 DIAGNOSIS — E55.9 VITAMIN D DEFICIENCY: ICD-10-CM

## 2025-04-04 DIAGNOSIS — E78.5 HYPERLIPIDEMIA, UNSPECIFIED HYPERLIPIDEMIA TYPE: ICD-10-CM

## 2025-04-10 ENCOUNTER — TELEPHONE (OUTPATIENT)
Dept: GASTROENTEROLOGY | Facility: CLINIC | Age: 65
End: 2025-04-10
Payer: COMMERCIAL

## 2025-04-10 NOTE — TELEPHONE ENCOUNTER
ENDO RECONCILIATION  Verify source of procedure(s): Office visit  If other, please list source: 3/19/25    TIME OUT-CONFIRM CORRECT PROCEDURE: EGD & Colonoscopy  Cardiology:   Pulmonology:  Blood thinner:   GLP-1: Tirzepatide  Additional DX/indication for procedure:     Please include any other notes relevant to endo reconciliation: N/A

## 2025-04-17 NOTE — PROGRESS NOTES
Well Woman Visit    CC: Scheduled annual well gyn visit  Chief Complaint   Patient presents with    Annual Exam         Post menopausal, using no HRT s/p hysterectomy/BSO    Last Completed Pap Smear    This patient has no relevant Health Maintenance data.        Last Completed Mammogram    This patient has no relevant Health Maintenance data.          HPI:   64 y.o.     History of Present Illness  The patient is a 64-year-old female who presents for an annual exam.    A total hysterectomy with BSO was performed in  due to endometriosis, which had spread to her colon, necessitating a colon resection. Menopause was induced at the age of 36 as a result of the surgery. Hormone replacement therapy has not been used since the procedure. Dyspareunia is reported, described as feeling like razor blades in the vagina. She has never had children and experiences emotional distress when seeing newborns, often leading to tears. Hormone therapy was avoided to prevent exacerbation of any residual endometriosis. The last DEXA scan was conducted approximately 2 years ago at [facility]. No history of abnormal Pap smears is noted, and there has been no change in sexual partners since the surgery.    Arthritis is reported in the knee, and similar symptoms are suspected in the hips, although the cause is uncertain.    SEXUAL HISTORY: Painful intercourse is reported. No change in sexual partners since surgery.    PAST SURGICAL HISTORY: Total hysterectomy and colon resection in .     PCP: does manage PMHx and preventative labs  History: PMHx, Meds, Allergies, PSHx, Social Hx, and POBHx all reviewed and updated.    PHYSICAL EXAM:  /83   Pulse 62   Wt 73.5 kg (162 lb)   BMI 29.63 kg/m²  Not found.  General- NAD, alert and oriented, appropriate  Psych- Normal mood, good memory  Neck- No masses, no thyroid enlargement  CV- Regular rhythm, no murnurs  Resp- CTA to bases, no wheezes  Abdomen- Soft, non distended, non  tender, no masses  Breast left-  Bilaterally symmetrical, no masses, non tender, no nipple discharge  Breast right- Bilaterally symmetrical, no masses, non tender, no nipple discharge  External genitalia- Normal female, no lesions, severe vulvovaginal atrophy with complete resorption of right labia minora and severe resorption of left labia minora.  No clitoral phimosis.  Urethra/meatus- Normal, no masses, non tender  Bladder- Normal, no masses, non tender  Vagina- Normal, severe atrophy, no lesions, no discharge.   patient with tenderness to palpation and tightness of the musculature level 2 and level 3 pelvic floor throughout the pelvis  Cvx- Absent  Uterus- Absent  Adnexa- No mass, non tender, Absent  Anus/Rectum/Perineum- Not performed  Lymphatic- No palpable neck, axillary, or groin nodes  Ext- No edema, no cyanosis    Skin- No lesions, no rashes, no acanthosis nigricans      ASSESSMENT and PLAN:    Diagnoses and all orders for this visit:    1. Well woman exam with routine gynecological exam (Primary)    2. Genitourinary syndrome of menopause  -     estradiol (ESTRACE VAGINAL) 0.1 MG/GM vaginal cream; Place 0.5 gm PV and massage 0.5 gm to vulva and vestibule at night 2x/week  Dispense: 42.5 g; Refill: 11    3. High-tone pelvic floor dysfunction  -     Ambulatory Referral to Physical Therapy for Evaluation & Treatment    4. Female dyspareunia  -     Ambulatory Referral to Physical Therapy for Evaluation & Treatment        Assessment & Plan  1. Genitourinary syndrome of menopause.  Symptoms include dryness, painful sex, and thinning of vaginal and vulvar tissues due to loss of estrogen. A comprehensive discussion was held regarding the potential benefits and risks associated with estrogen cream. Reassurance was provided that the cream does not significantly enter the bloodstream and is safe, with endorsements from multiple medical societies.  - Prescription for vaginal and vulvar estrogen cream: Apply daily for  2 weeks, then reduce to twice weekly indefinitely.  - Provision of personal lubricants.    2. High tone pelvic floor dysfunction.  This condition is likely contributing to chronic pelvic pain and painful intercourse.  - Recommendation for pelvic floor physical therapy.    3. Health maintenance.  A DEXA scan was performed approximately 2 years ago.  - Schedule a repeat DEXA scan to assess bone density.    Follow-up  Follow up in 2 to 3 months.       Preventative:  BREAST HEALTH- Monthly self breast exam importance and how to reviewed. MMG and/or MRI (prn) reviewed per society guidelines and her individual history. Screen: Pt will call to schedule  CERVICAL CANCER Screening- Reviewed current ASCCP guidelines for screening w and wo cotest HPV, age specific.  Screen: Not medically needed  COLON CANCER Screening- Reviewed current medical society guidelines and options.  Screen:  Already up to date  BONE HEALTH- Reviewed current medical society guidelines and options for testing, calcium and vit D intake.  Weight bearing exercise.  DEXA: Pt will call to schedule  Follow up PCP/Specialist PMHx and/or Labs      She understands the importance of having any ordered tests to be performed in a timely fashion.  The risks of not performing them include, but are not limited to, advanced cancer stages, bone loss from osteoporosis and/or subsequent increase in morbidity and/or mortality.  She is encouraged to review her results online and/or contact or office if she has questions.     Follow Up:  Return in about 3 months (around 7/23/2025).            Linda Arnold MD  04/23/2025    [facility]  [facility] [place] [address]  Dept: [phone]  Dept Fax: [phone]  Loc: [phone]  Loc Fax: [phone]     Patient or patient representative verbalized consent for the use of Ambient Listening during the visit with  Linda Arnold MD for chart documentation. 4/23/2025   12:19 EDT

## 2025-04-23 ENCOUNTER — OFFICE VISIT (OUTPATIENT)
Dept: OBSTETRICS AND GYNECOLOGY | Age: 65
End: 2025-04-23
Payer: COMMERCIAL

## 2025-04-23 VITALS
WEIGHT: 162 LBS | BODY MASS INDEX: 29.63 KG/M2 | DIASTOLIC BLOOD PRESSURE: 83 MMHG | HEART RATE: 62 BPM | SYSTOLIC BLOOD PRESSURE: 165 MMHG

## 2025-04-23 DIAGNOSIS — M62.89 HIGH-TONE PELVIC FLOOR DYSFUNCTION: ICD-10-CM

## 2025-04-23 DIAGNOSIS — N95.8 GENITOURINARY SYNDROME OF MENOPAUSE: ICD-10-CM

## 2025-04-23 DIAGNOSIS — Z01.419 WELL WOMAN EXAM WITH ROUTINE GYNECOLOGICAL EXAM: Primary | ICD-10-CM

## 2025-04-23 DIAGNOSIS — Z13.820 OSTEOPOROSIS SCREENING: ICD-10-CM

## 2025-04-23 DIAGNOSIS — N94.10 FEMALE DYSPAREUNIA: ICD-10-CM

## 2025-04-23 RX ORDER — ESTRADIOL 0.1 MG/G
CREAM VAGINAL
Qty: 42.5 G | Refills: 11 | Status: SHIPPED | OUTPATIENT
Start: 2025-04-23

## 2025-04-23 NOTE — LETTER
2025     [facility]  [address]  [place] [address]    Patient: Rajwinder An   YOB: 1960   Date of Visit: 2025     Dear [facility]:       Thank you for seeing Rajwinder An for me for evaluation. Below are the relevant portions of my assessment and plan of care.    If you have questions, please do not hesitate to call me. I look forward to following Rajwinder along with you.         Sincerely,        Linda Arnold MD        CC: No Recipients    Linda Arnold MD  25 1220  Sign when Signing Visit  Well Woman Visit    CC: Scheduled annual well gyn visit  Chief Complaint   Patient presents with   • Annual Exam         Post menopausal, using no HRT s/p hysterectomy/BSO    Last Completed Pap Smear    This patient has no relevant Health Maintenance data.        Last Completed Mammogram    This patient has no relevant Health Maintenance data.          HPI:   64 y.o.     History of Present Illness  The patient is a 64-year-old female who presents for an annual exam.    A total hysterectomy with BSO was performed in  due to endometriosis, which had spread to her colon, necessitating a colon resection. Menopause was induced at the age of 36 as a result of the surgery. Hormone replacement therapy has not been used since the procedure. Dyspareunia is reported, described as feeling like razor blades in the vagina. She has never had children and experiences emotional distress when seeing newborns, often leading to tears. Hormone therapy was avoided to prevent exacerbation of any residual endometriosis. The last DEXA scan was conducted approximately 2 years ago at [facility]. No history of abnormal Pap smears is noted, and there has been no change in sexual partners since the surgery.    Arthritis is reported in the knee, and similar symptoms are suspected in the hips, although the cause is  uncertain.    SEXUAL HISTORY: Painful intercourse is reported. No change in sexual partners since surgery.    PAST SURGICAL HISTORY: Total hysterectomy and colon resection in 1998.     PCP: does manage PMHx and preventative labs  History: PMHx, Meds, Allergies, PSHx, Social Hx, and POBHx all reviewed and updated.    PHYSICAL EXAM:  /83   Pulse 62   Wt 73.5 kg (162 lb)   BMI 29.63 kg/m²  Not found.  General- NAD, alert and oriented, appropriate  Psych- Normal mood, good memory  Neck- No masses, no thyroid enlargement  CV- Regular rhythm, no murnurs  Resp- CTA to bases, no wheezes  Abdomen- Soft, non distended, non tender, no masses  Breast left-  Bilaterally symmetrical, no masses, non tender, no nipple discharge  Breast right- Bilaterally symmetrical, no masses, non tender, no nipple discharge  External genitalia- Normal female, no lesions, severe vulvovaginal atrophy with complete resorption of right labia minora and severe resorption of left labia minora.  No clitoral phimosis.  Urethra/meatus- Normal, no masses, non tender  Bladder- Normal, no masses, non tender  Vagina- Normal, severe atrophy, no lesions, no discharge.   patient with tenderness to palpation and tightness of the musculature level 2 and level 3 pelvic floor throughout the pelvis  Cvx- Absent  Uterus- Absent  Adnexa- No mass, non tender, Absent  Anus/Rectum/Perineum- Not performed  Lymphatic- No palpable neck, axillary, or groin nodes  Ext- No edema, no cyanosis    Skin- No lesions, no rashes, no acanthosis nigricans      ASSESSMENT and PLAN:    Diagnoses and all orders for this visit:    1. Well woman exam with routine gynecological exam (Primary)    2. Genitourinary syndrome of menopause  -     estradiol (ESTRACE VAGINAL) 0.1 MG/GM vaginal cream; Place 0.5 gm PV and massage 0.5 gm to vulva and vestibule at night 2x/week  Dispense: 42.5 g; Refill: 11    3. High-tone pelvic floor dysfunction  -     Ambulatory Referral to Physical Therapy  for Evaluation & Treatment    4. Female dyspareunia  -     Ambulatory Referral to Physical Therapy for Evaluation & Treatment        Assessment & Plan  1. Genitourinary syndrome of menopause.  Symptoms include dryness, painful sex, and thinning of vaginal and vulvar tissues due to loss of estrogen. A comprehensive discussion was held regarding the potential benefits and risks associated with estrogen cream. Reassurance was provided that the cream does not significantly enter the bloodstream and is safe, with endorsements from multiple medical societies.  - Prescription for vaginal and vulvar estrogen cream: Apply daily for 2 weeks, then reduce to twice weekly indefinitely.  - Provision of personal lubricants.    2. High tone pelvic floor dysfunction.  This condition is likely contributing to chronic pelvic pain and painful intercourse.  - Recommendation for pelvic floor physical therapy.    3. Health maintenance.  A DEXA scan was performed approximately 2 years ago.  - Schedule a repeat DEXA scan to assess bone density.    Follow-up  Follow up in 2 to 3 months.       Preventative:  BREAST HEALTH- Monthly self breast exam importance and how to reviewed. MMG and/or MRI (prn) reviewed per society guidelines and her individual history. Screen: Pt will call to schedule  CERVICAL CANCER Screening- Reviewed current ASCCP guidelines for screening w and wo cotest HPV, age specific.  Screen: Not medically needed  COLON CANCER Screening- Reviewed current medical society guidelines and options.  Screen:  Already up to date  BONE HEALTH- Reviewed current medical society guidelines and options for testing, calcium and vit D intake.  Weight bearing exercise.  DEXA: Pt will call to schedule  Follow up PCP/Specialist PMHx and/or Labs      She understands the importance of having any ordered tests to be performed in a timely fashion.  The risks of not performing them include, but are not limited to, advanced cancer stages, bone loss  from osteoporosis and/or subsequent increase in morbidity and/or mortality.  She is encouraged to review her results online and/or contact or office if she has questions.     Follow Up:  Return in about 3 months (around 7/23/2025).            Linda Arnold MD  04/23/2025    [facility] [place] [facility]  [facility] [place] [address]  Dept: [phone]  Dept Fax: [phone]  Loc: [phone]  Loc Fax: [phone]     Patient or patient representative verbalized consent for the use of Ambient Listening during the visit with  Linda Arnold MD for chart documentation. 4/23/2025  12:19 EDT

## 2025-04-23 NOTE — PROGRESS NOTES
Chief Complaint  Annual Exam (64 year old female here today for her annual physical. Needs lab review)    Subjective      History of Present Illness  The patient is a 64-year-old female who presents for an annual wellness exam.    The chief complaint includes difficulties in obtaining a blood sample for her A1c test. She has discontinued the use of Humalog and Lantus and is currently on Mounjaro 5 mg, which has been effective in managing her blood glucose levels. Hypoglycemic episodes have been reported, with one instance of her blood glucose level dropping to 60. A colonoscopy and EGD are scheduled for next Wednesday. Since starting Mounjaro in 2024, her weight has decreased from 182 pounds to 162 pounds.    She is also on simvastatin 10 mg and Zetia 10 mg daily for cholesterol management.    Osteoporosis has been diagnosed, and she has been prescribed vitamin D supplements, which are taken once a week. Multivitamin intake has not been regular. Fatigue is reported, attributed to her age and 12-hour work shifts. Currently, she is taking a liquid vitamin supplement.    Lexapro has been taken for an extended period, and discontinuation is being considered due to concerns about weight gain. No neuropathy is reported.    Arthritis in the left knee is managed with diclofenac. Intake of diclofenac has been reduced from twice daily to once weekly due to potential kidney damage. The diclofenac prescription has not been refilled recently and may have . A refill of the diclofenac prescription is requested.    Blood pressure medication was not taken last night. She is not currently under the care of a cardiologist.    Cirrhosis has been diagnosed, and ongoing diarrhea is believed to be related to the liver condition.    A hysterectomy for endometriosis has been performed, and she has been referred to physical therapy for urinary incontinence. A DEXA scan has been ordered by her gynecologist.    Pepcid is taken  for stomach issues.    Zyrtec is taken over the counter for allergies.    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       Past Medical History:   Diagnosis Date    Acid reflux     Cirrhosis     Colitis     Diabetes     Endometriosis     Esophageal varices     Gastroparesis     Hiatal hernia     Hyperlipidemia     Hypertension     Intraductal papilloma of breast, right     Knee swelling [id number]    Non-alcoholic fatty liver disease         Past Surgical History:   Procedure Laterality Date    BREAST SURGERY      CHOLECYSTECTOMY  12/04/2006    COLON RESECTION  12/04/1998    [facility] [place]    COLONOSCOPY      HYSTERECTOMY      TONSILLECTOMY  12/13/1972    UPPER GASTROINTESTINAL ENDOSCOPY      [facility]        Social History     Tobacco Use   Smoking Status Never    Passive exposure: Never   Smokeless Tobacco Never        Patient Care Team:  Julienne Galvan APRN as PCP - General (Nurse Practitioner)  Mirela Barakat APRN as Nurse Practitioner (Nurse Practitioner)  Linda Arnold MD as Consulting Physician (Obstetrics and Gynecology)    Allergies   Allergen Reactions    Penicillins Rash     As a Child             Current Outpatient Medications:     brimonidine-timolol (COMBIGAN) 0.2-0.5 % ophthalmic solution, INSTILL ONE DROP INTO BOTH EYES TWICE DAILY, Disp: , Rfl:     carvedilol (COREG) 12.5 MG tablet, Take 1 tablet by mouth 2 (Two) Times a Day With Meals., Disp: 180 tablet, Rfl: 1    cetirizine (ZyrTEC Allergy) 10 MG tablet, Zyrtec 10 mg oral tablet take 1 tablet (10 mg) by oral route once daily   Active, Disp: , Rfl:     cholestyramine (QUESTRAN) 4 GM/DOSE powder, Take 1 packet by mouth Daily. mixed with a liquid, Disp: 378 g, Rfl: 3    Continuous Glucose Sensor (Dexcom G7 Sensor) misc, Use 1 each Every 10 (Ten) Days., Disp: 1 each, Rfl: 5    diclofenac (VOLTAREN) 75 MG EC tablet, Take 1 tablet twice  "a day as needed for left knee pain and arthritis., Disp: 180 tablet, Rfl: 1    estradiol (ESTRACE VAGINAL) 0.1 MG/GM vaginal cream, Place 0.5 gm PV and massage 0.5 gm to vulva and vestibule at night 2x/week, Disp: 42.5 g, Rfl: 11    ezetimibe (ZETIA) 10 MG tablet, Take 1 tablet by mouth Daily., Disp: 90 tablet, Rfl: 1    famotidine (PEPCID) 40 MG tablet, , Disp: , Rfl:     lisinopril (PRINIVIL,ZESTRIL) 20 MG tablet, Take 1 tablet by mouth Daily., Disp: 90 tablet, Rfl: 1    ondansetron (ZOFRAN) 4 MG tablet, Take 1 tablet by mouth Every 8 (Eight) Hours As Needed for Nausea or Vomiting., Disp: 30 tablet, Rfl: 2    riFAXIMin (Xifaxan) 550 MG tablet, Take 1 tablet by mouth Every 12 (Twelve) Hours., Disp: 180 tablet, Rfl: 3    simvastatin (Zocor) 10 MG tablet, Take 1 tablet by mouth Every Night., Disp: 90 tablet, Rfl: 1    Tirzepatide 5 MG/0.5ML solution auto-injector, Inject 5 mg under the skin into the appropriate area as directed 1 (One) Time Per Week., Disp: 6 mL, Rfl: 3    vitamin D (ERGOCALCIFEROL) 1.25 MG (32832 UT) capsule capsule, Take 1 capsule by mouth 1 (One) Time Per Week., Disp: 13 capsule, Rfl: 3    buPROPion XL (Wellbutrin XL) 150 MG 24 hr tablet, Take 1 tablet by mouth Every Morning., Disp: 90 tablet, Rfl: 3    sodium-potassium-magnesium sulfates (Suprep Bowel Prep Kit) 17.5-3.13-1.6 GM/177ML solution oral solution, Take 2 bottles by mouth Take As Directed. (Patient not taking: Reported on 5/2/2025), Disp: 177 mL, Rfl: 0    Objective     Vitals:    05/02/25 1433   BP: 158/82  Comment: Pt has not taken her bp med today   BP Location: Left arm   Patient Position: Sitting   Cuff Size: Large Adult   Pulse: 62   Resp: 18   Temp: 98.2 °F (36.8 °C)   TempSrc: Temporal   SpO2: 99%   Weight: 73.9 kg (162 lb 14.4 oz)   Height: 157.5 cm (62.01\")        Wt Readings from Last 3 Encounters:   05/02/25 73.9 kg (162 lb 14.4 oz)   04/23/25 73.5 kg (162 lb)   03/19/25 74.5 kg (164 lb 3.2 oz)        BP Readings from Last 3 " Encounters:   05/02/25 158/82   04/23/25 165/83   03/19/25 159/77          Physical Exam  Vitals reviewed.   Constitutional:       General: She is not in acute distress.  Eyes:      Conjunctiva/sclera: Conjunctivae normal.   Cardiovascular:      Rate and Rhythm: Normal rate and regular rhythm.      Heart sounds: Normal heart sounds.   Pulmonary:      Effort: Pulmonary effort is normal.      Breath sounds: Normal breath sounds. No wheezing, rhonchi or rales.   Musculoskeletal:      Right lower leg: No edema.      Left lower leg: No edema.   Lymphadenopathy:      Cervical: No cervical adenopathy.   Skin:     General: Skin is warm and dry.   Neurological:      General: No focal deficit present.      Mental Status: She is alert.   Psychiatric:         Mood and Affect: Mood normal.         Thought Content: Thought content normal.                Result Review   The following data was reviewed by: ALBA Carvalho on 05/02/2025:  [x]  Tests & Results  []  Hospitalization/Emergency Department/Urgent Care  []  Internal/External Consultant Notes       Assessment and Plan     Diagnoses and all orders for this visit:    1. Annual physical exam (Primary)  -     CBC & Differential; Future  -     Comprehensive Metabolic Panel; Future  -     Lipid Panel; Future  -     TSH Rfx On Abnormal To Free T4; Future  -     Hemoglobin A1c; Future  -     Microalbumin / Creatinine Urine Ratio - Urine, Clean Catch; Future  -     Mammo Screening Digital Tomosynthesis Bilateral With CAD; Future    2. Type 2 diabetes mellitus with hyperglycemia, with long-term current use of insulin  -     Comprehensive Metabolic Panel; Future  -     Hemoglobin A1c; Future    3. Hyperlipidemia, unspecified hyperlipidemia type  -     Lipid Panel; Future    4. Vitamin D deficiency  -     Vitamin D,25-Hydroxy; Future    5. Depression, unspecified depression type    6. Chronic pain of left knee    7. Essential hypertension    8. Cirrhosis of liver without ascites,  unspecified hepatic cirrhosis type    9. Encounter for screening mammogram for breast cancer  -     Mammo Screening Digital Tomosynthesis Bilateral With CAD; Future    10. Allergic rhinitis, unspecified seasonality, unspecified trigger    11. Need for shingles vaccine  -     Shingrix Vaccine    Other orders  -     buPROPion XL (Wellbutrin XL) 150 MG 24 hr tablet; Take 1 tablet by mouth Every Morning.  Dispense: 90 tablet; Refill: 3  -     Tirzepatide 5 MG/0.5ML solution auto-injector; Inject 5 mg under the skin into the appropriate area as directed 1 (One) Time Per Week.  Dispense: 6 mL; Refill: 3  -     lisinopril (PRINIVIL,ZESTRIL) 20 MG tablet; Take 1 tablet by mouth Daily.  Dispense: 90 tablet; Refill: 1  -     carvedilol (COREG) 12.5 MG tablet; Take 1 tablet by mouth 2 (Two) Times a Day With Meals.  Dispense: 180 tablet; Refill: 1  -     simvastatin (Zocor) 10 MG tablet; Take 1 tablet by mouth Every Night.  Dispense: 90 tablet; Refill: 1  -     ezetimibe (ZETIA) 10 MG tablet; Take 1 tablet by mouth Daily.  Dispense: 90 tablet; Refill: 1  -     vitamin D (ERGOCALCIFEROL) 1.25 MG (64101 UT) capsule capsule; Take 1 capsule by mouth 1 (One) Time Per Week.  Dispense: 13 capsule; Refill: 3  -     diclofenac (VOLTAREN) 75 MG EC tablet; Take 1 tablet twice a day as needed for left knee pain and arthritis.  Dispense: 180 tablet; Refill: 1         Assessment & Plan  1. Diabetes Mellitus.  - Her A1c levels have shown significant improvement, reducing from 8.5 to 5.7 over the past four months.  - She has successfully transitioned from an uncontrolled diabetic state to a well-managed condition without the need for insulin.  - She will discontinue Mounjaro 5 mg for a period of one week prior to her upcoming colonoscopy and EGD and will resume the medication post-procedure.  - An A1c test will be conducted in six months. A prescription for Mounjaro 5 mg with a three-month supply and three refills will be provided.    2.  Hypercholesterolemia.  - Her cholesterol levels have remained stable over the past month.  - She will continue her current regimen of simvastatin 10 mg and Zetia 10 mg daily.  - A review of her recent labs indicates no significant changes in cholesterol levels.  - Continued monitoring of cholesterol levels will be done every six months.    3. Vitamin D Deficiency.  - Her vitamin D levels have not improved.  - She is advised to take her vitamin D supplement with food, preferably a fatty meal, once a week.  - Sunlight exposure is also recommended. A multivitamin supplement for women is suggested.  - A prescription for vitamin D supplements will be provided.    4. Depression.  - She will discontinue Lexapro and start Wellbutrin XL (bupropion) 150 mg once daily in the morning.  - A prescription for Wellbutrin XL (bupropion) 150 mg with a 90-day supply will be provided.  - Discussion regarding potential side effects and benefits of Wellbutrin was conducted.  - Monitoring of mood and symptoms will be done at follow-up.    5. Left Knee Pain.  - She will continue diclofenac 75 mg twice daily as needed for left knee pain and arthritis.  - A prescription for diclofenac 75 mg with a three-month supply and one refill will be provided.  - Advised to take diclofenac with food to minimize gastrointestinal side effects.  - Evaluation of knee pain and effectiveness of diclofenac will be done at follow-up.    6. Hypertension.  - Her blood pressure was slightly elevated today, likely due to missing her medication dose last night.  - She will continue her current regimen of lisinopril 20 mg and carvedilol 12.5 mg twice daily.  - A prescription for lisinopril 20 mg and carvedilol 12.5 mg with a six-month supply will be provided.  - Blood pressure will be monitored regularly to ensure control.    7. Cirrhosis.  - She continues to experience diarrhea, which is likely related to her liver condition.  - Discussion regarding the impact of  diabetes on liver function was conducted.  - Continued monitoring of liver function and symptoms will be done.    8. Health Maintenance.  - A mammogram will be ordered.  - She will receive her second shingles vaccine today.  - Labs will be ordered for six months to monitor overall health and medication effectiveness.    9. Gastrointestinal issues.  - She is on Pepcid for gastrointestinal symptoms.  - Continued use of Pepcid and monitoring of gastrointestinal symptoms will be done.    10. Allergies.  - She takes Zyrtec over the counter for allergy symptoms.  - Advised to continue Zyrtec as needed for allergy management.    Follow-up  The patient will follow up in six months.      Patient was given instructions and counseling regarding her condition or for health maintenance advice. Please see specific information pulled into the AVS if appropriate.     Follow Up   Return in about 6 months (around 11/2/2025) for Recheck.    Patient or patient representative verbalized consent for the use of Ambient Listening during the visit with  ALBA Carvalho for chart documentation. 5/2/2025  15:43 EDT    ALBA Carvalho

## 2025-04-24 ENCOUNTER — LAB (OUTPATIENT)
Facility: HOSPITAL | Age: 65
End: 2025-04-24
Payer: COMMERCIAL

## 2025-04-24 DIAGNOSIS — Z00.00 ANNUAL PHYSICAL EXAM: ICD-10-CM

## 2025-04-24 LAB
ALBUMIN SERPL-MCNC: 3.7 G/DL (ref 3.5–5.2)
ALBUMIN UR-MCNC: 2.3 MG/DL
ALBUMIN/GLOB SERPL: 1.2 G/DL
ALP SERPL-CCNC: 101 U/L (ref 39–117)
ALT SERPL W P-5'-P-CCNC: 13 U/L (ref 1–33)
ANION GAP SERPL CALCULATED.3IONS-SCNC: 9 MMOL/L (ref 5–15)
AST SERPL-CCNC: 31 U/L (ref 1–32)
BASOPHILS # BLD AUTO: 0.04 10*3/MM3 (ref 0–0.2)
BASOPHILS NFR BLD AUTO: 1 % (ref 0–1.5)
BILIRUB SERPL-MCNC: 1.5 MG/DL (ref 0–1.2)
BUN SERPL-MCNC: 10 MG/DL (ref 8–23)
BUN/CREAT SERPL: 11.8 (ref 7–25)
CALCIUM SPEC-SCNC: 9.3 MG/DL (ref 8.6–10.5)
CHLORIDE SERPL-SCNC: 107 MMOL/L (ref 98–107)
CHOLEST SERPL-MCNC: 198 MG/DL (ref 0–200)
CO2 SERPL-SCNC: 25 MMOL/L (ref 22–29)
CREAT SERPL-MCNC: 0.85 MG/DL (ref 0.57–1)
CREAT UR-MCNC: 168.8 MG/DL
DEPRECATED RDW RBC AUTO: 45.7 FL (ref 37–54)
EGFRCR SERPLBLD CKD-EPI 2021: 77.6 ML/MIN/1.73
EOSINOPHIL # BLD AUTO: 0.18 10*3/MM3 (ref 0–0.4)
EOSINOPHIL NFR BLD AUTO: 4.4 % (ref 0.3–6.2)
ERYTHROCYTE [DISTWIDTH] IN BLOOD BY AUTOMATED COUNT: 13.5 % (ref 12.3–15.4)
GLOBULIN UR ELPH-MCNC: 3.2 GM/DL
GLUCOSE SERPL-MCNC: 129 MG/DL (ref 65–99)
HCT VFR BLD AUTO: 37.9 % (ref 34–46.6)
HDLC SERPL-MCNC: 54 MG/DL (ref 40–60)
HGB BLD-MCNC: 13 G/DL (ref 12–15.9)
IMM GRANULOCYTES # BLD AUTO: 0.01 10*3/MM3 (ref 0–0.05)
IMM GRANULOCYTES NFR BLD AUTO: 0.2 % (ref 0–0.5)
LDLC SERPL CALC-MCNC: 127 MG/DL (ref 0–100)
LDLC/HDLC SERPL: 2.32 {RATIO}
LYMPHOCYTES # BLD AUTO: 0.98 10*3/MM3 (ref 0.7–3.1)
LYMPHOCYTES NFR BLD AUTO: 24.2 % (ref 19.6–45.3)
MCH RBC QN AUTO: 31.9 PG (ref 26.6–33)
MCHC RBC AUTO-ENTMCNC: 34.3 G/DL (ref 31.5–35.7)
MCV RBC AUTO: 92.9 FL (ref 79–97)
MICROALBUMIN/CREAT UR: 13.6 MG/G (ref 0–29)
MONOCYTES # BLD AUTO: 0.25 10*3/MM3 (ref 0.1–0.9)
MONOCYTES NFR BLD AUTO: 6.2 % (ref 5–12)
NEUTROPHILS NFR BLD AUTO: 2.59 10*3/MM3 (ref 1.7–7)
NEUTROPHILS NFR BLD AUTO: 64 % (ref 42.7–76)
NRBC BLD AUTO-RTO: 0 /100 WBC (ref 0–0.2)
PLATELET # BLD AUTO: 116 10*3/MM3 (ref 140–450)
PMV BLD AUTO: 12.4 FL (ref 6–12)
POTASSIUM SERPL-SCNC: 3.9 MMOL/L (ref 3.5–5.2)
PROT SERPL-MCNC: 6.9 G/DL (ref 6–8.5)
RBC # BLD AUTO: 4.08 10*6/MM3 (ref 3.77–5.28)
SODIUM SERPL-SCNC: 141 MMOL/L (ref 136–145)
TRIGL SERPL-MCNC: 93 MG/DL (ref 0–150)
TSH SERPL DL<=0.05 MIU/L-ACNC: 2.6 UIU/ML (ref 0.27–4.2)
VLDLC SERPL-MCNC: 17 MG/DL (ref 5–40)
WBC NRBC COR # BLD AUTO: 4.05 10*3/MM3 (ref 3.4–10.8)

## 2025-04-24 PROCEDURE — 36415 COLL VENOUS BLD VENIPUNCTURE: CPT

## 2025-04-24 PROCEDURE — 80050 GENERAL HEALTH PANEL: CPT

## 2025-04-24 PROCEDURE — 80061 LIPID PANEL: CPT

## 2025-04-24 PROCEDURE — 82043 UR ALBUMIN QUANTITATIVE: CPT

## 2025-04-24 PROCEDURE — 82570 ASSAY OF URINE CREATININE: CPT

## 2025-04-30 NOTE — PRE-PROCEDURE INSTRUCTIONS
"Instructed on date and arrival time of 0930. Instructed that arrival time is not their procedure time but allows time to prepare for procedure.  Come to entrance \"C\". Must have  over age 18 to drive home.  May have two visitors; however, children under 12 must stay in waiting room.  Discussed clear liquid diet (no red or purple), bowel prep, and NPO.  May take medications as usual except for blood thinners, diabetic medications, and weight loss medications.  Verbalized understanding of instructions given.  Instructed to call for questions or concerns.  Hold Mounjaro for one week prior to procedure.  "

## 2025-05-02 ENCOUNTER — OFFICE VISIT (OUTPATIENT)
Dept: INTERNAL MEDICINE | Age: 65
End: 2025-05-02
Payer: COMMERCIAL

## 2025-05-02 VITALS
SYSTOLIC BLOOD PRESSURE: 158 MMHG | HEIGHT: 62 IN | BODY MASS INDEX: 29.98 KG/M2 | WEIGHT: 162.9 LBS | TEMPERATURE: 98.2 F | HEART RATE: 62 BPM | RESPIRATION RATE: 18 BRPM | DIASTOLIC BLOOD PRESSURE: 82 MMHG | OXYGEN SATURATION: 99 %

## 2025-05-02 DIAGNOSIS — Z23 NEED FOR SHINGLES VACCINE: ICD-10-CM

## 2025-05-02 DIAGNOSIS — F32.A DEPRESSION, UNSPECIFIED DEPRESSION TYPE: Chronic | ICD-10-CM

## 2025-05-02 DIAGNOSIS — E78.5 HYPERLIPIDEMIA, UNSPECIFIED HYPERLIPIDEMIA TYPE: Chronic | ICD-10-CM

## 2025-05-02 DIAGNOSIS — E11.65 TYPE 2 DIABETES MELLITUS WITH HYPERGLYCEMIA, WITH LONG-TERM CURRENT USE OF INSULIN: Chronic | ICD-10-CM

## 2025-05-02 DIAGNOSIS — Z79.4 TYPE 2 DIABETES MELLITUS WITH HYPERGLYCEMIA, WITH LONG-TERM CURRENT USE OF INSULIN: Chronic | ICD-10-CM

## 2025-05-02 DIAGNOSIS — I10 ESSENTIAL HYPERTENSION: Chronic | ICD-10-CM

## 2025-05-02 DIAGNOSIS — Z12.31 ENCOUNTER FOR SCREENING MAMMOGRAM FOR BREAST CANCER: ICD-10-CM

## 2025-05-02 DIAGNOSIS — Z00.00 ANNUAL PHYSICAL EXAM: Primary | ICD-10-CM

## 2025-05-02 DIAGNOSIS — K74.60 CIRRHOSIS OF LIVER WITHOUT ASCITES, UNSPECIFIED HEPATIC CIRRHOSIS TYPE: ICD-10-CM

## 2025-05-02 DIAGNOSIS — M25.562 CHRONIC PAIN OF LEFT KNEE: Chronic | ICD-10-CM

## 2025-05-02 DIAGNOSIS — G89.29 CHRONIC PAIN OF LEFT KNEE: Chronic | ICD-10-CM

## 2025-05-02 DIAGNOSIS — E55.9 VITAMIN D DEFICIENCY: ICD-10-CM

## 2025-05-02 DIAGNOSIS — J30.9 ALLERGIC RHINITIS, UNSPECIFIED SEASONALITY, UNSPECIFIED TRIGGER: Chronic | ICD-10-CM

## 2025-05-02 RX ORDER — DICLOFENAC SODIUM 75 MG/1
TABLET, DELAYED RELEASE ORAL
Qty: 180 TABLET | Refills: 1 | Status: SHIPPED | OUTPATIENT
Start: 2025-05-02

## 2025-05-02 RX ORDER — LISINOPRIL 20 MG/1
20 TABLET ORAL DAILY
Qty: 90 TABLET | Refills: 1 | Status: SHIPPED | OUTPATIENT
Start: 2025-05-02

## 2025-05-02 RX ORDER — CARVEDILOL 12.5 MG/1
12.5 TABLET ORAL 2 TIMES DAILY WITH MEALS
Qty: 180 TABLET | Refills: 1 | Status: SHIPPED | OUTPATIENT
Start: 2025-05-02

## 2025-05-02 RX ORDER — EZETIMIBE 10 MG/1
10 TABLET ORAL DAILY
Qty: 90 TABLET | Refills: 1 | Status: SHIPPED | OUTPATIENT
Start: 2025-05-02

## 2025-05-02 RX ORDER — BUPROPION HYDROCHLORIDE 150 MG/1
150 TABLET ORAL EVERY MORNING
Qty: 90 TABLET | Refills: 3 | Status: SHIPPED | OUTPATIENT
Start: 2025-05-02

## 2025-05-02 RX ORDER — ERGOCALCIFEROL 1.25 MG/1
50000 CAPSULE, LIQUID FILLED ORAL WEEKLY
Qty: 13 CAPSULE | Refills: 3 | Status: SHIPPED | OUTPATIENT
Start: 2025-05-02

## 2025-05-02 RX ORDER — SIMVASTATIN 10 MG
10 TABLET ORAL NIGHTLY
Qty: 90 TABLET | Refills: 1 | Status: SHIPPED | OUTPATIENT
Start: 2025-05-02

## 2025-05-06 ENCOUNTER — ANESTHESIA EVENT (OUTPATIENT)
Dept: GASTROENTEROLOGY | Facility: HOSPITAL | Age: 65
End: 2025-05-06
Payer: COMMERCIAL

## 2025-05-06 NOTE — ANESTHESIA PREPROCEDURE EVALUATION
Anesthesia Evaluation     Patient summary reviewed and Nursing notes reviewed   NPO Solid Status: > 8 hours  NPO Liquid Status: > 6 hours           Airway   Mallampati: II  TM distance: >3 FB  Neck ROM: full  No difficulty expected  Dental - normal exam     Pulmonary     breath sounds clear to auscultation  Cardiovascular - normal exam  Exercise tolerance: good (4-7 METS)    Rhythm: regular  Rate: normal    (+) hypertension well controlled, hyperlipidemia      Neuro/Psych  (+) psychiatric history Depression  GI/Hepatic/Renal/Endo    (+) hiatal hernia, GERD well controlled, liver disease cirrhosis, diabetes mellitus type 2 well controlled    Musculoskeletal     Abdominal    Substance History      OB/GYN          Other        ROS/Med Hx Other: Cirrhosis, hx varices- no previous banding, fam hx     Last dose mounjaro- 04/28    No EKG on file                   Anesthesia Plan    ASA 3     general   total IV anesthesia  (Total IV Anesthesia      Discussed risks with pt including aspiration, allergic reactions, apnea, advanced airway placement. Pt verbalized understanding. All questions answered.       Patient understands anesthesia not responsible for dental damage.  )  intravenous induction     Anesthetic plan, risks, benefits, and alternatives have been provided, discussed and informed consent has been obtained with: patient and spouse/significant other.  Pre-procedure education provided  Plan discussed with CRNA.      CODE STATUS:

## 2025-05-07 ENCOUNTER — ANESTHESIA (OUTPATIENT)
Dept: GASTROENTEROLOGY | Facility: HOSPITAL | Age: 65
End: 2025-05-07
Payer: COMMERCIAL

## 2025-05-07 ENCOUNTER — HOSPITAL ENCOUNTER (OUTPATIENT)
Facility: HOSPITAL | Age: 65
Setting detail: HOSPITAL OUTPATIENT SURGERY
Discharge: HOME | End: 2025-05-07
Attending: INTERNAL MEDICINE | Admitting: INTERNAL MEDICINE
Payer: COMMERCIAL

## 2025-05-07 VITALS
OXYGEN SATURATION: 100 % | HEART RATE: 77 BPM | TEMPERATURE: 96 F | DIASTOLIC BLOOD PRESSURE: 77 MMHG | RESPIRATION RATE: 19 BRPM | WEIGHT: 157.19 LBS | SYSTOLIC BLOOD PRESSURE: 140 MMHG | BODY MASS INDEX: 28.74 KG/M2

## 2025-05-07 DIAGNOSIS — Z80.0 FH: COLON CANCER: ICD-10-CM

## 2025-05-07 DIAGNOSIS — K59.1 FUNCTIONAL DIARRHEA: ICD-10-CM

## 2025-05-07 DIAGNOSIS — K76.82 HEPATIC ENCEPHALOPATHY: ICD-10-CM

## 2025-05-07 DIAGNOSIS — I85.00 ESOPHAGEAL VARICES WITHOUT BLEEDING, UNSPECIFIED ESOPHAGEAL VARICES TYPE: ICD-10-CM

## 2025-05-07 DIAGNOSIS — Z86.0100 HISTORY OF COLON POLYPS: ICD-10-CM

## 2025-05-07 DIAGNOSIS — K74.60 CIRRHOSIS OF LIVER WITHOUT ASCITES, UNSPECIFIED HEPATIC CIRRHOSIS TYPE: ICD-10-CM

## 2025-05-07 LAB — GLUCOSE BLDC GLUCOMTR-MCNC: 93 MG/DL (ref 70–99)

## 2025-05-07 PROCEDURE — 25010000002 LIDOCAINE PF 2% 2 % SOLUTION: Performed by: NURSE ANESTHETIST, CERTIFIED REGISTERED

## 2025-05-07 PROCEDURE — 25810000003 LACTATED RINGERS PER 1000 ML

## 2025-05-07 PROCEDURE — 25010000002 PROPOFOL 10 MG/ML EMULSION: Performed by: NURSE ANESTHETIST, CERTIFIED REGISTERED

## 2025-05-07 PROCEDURE — 82948 REAGENT STRIP/BLOOD GLUCOSE: CPT

## 2025-05-07 PROCEDURE — 88305 TISSUE EXAM BY PATHOLOGIST: CPT | Performed by: INTERNAL MEDICINE

## 2025-05-07 DEVICE — DEV CLIP ENDO RESOLUTION360 CONTRL ROT 235CM: Type: IMPLANTABLE DEVICE | Site: SIGMOID COLON | Status: FUNCTIONAL

## 2025-05-07 RX ORDER — LIDOCAINE HYDROCHLORIDE 20 MG/ML
INJECTION, SOLUTION EPIDURAL; INFILTRATION; INTRACAUDAL; PERINEURAL AS NEEDED
Status: DISCONTINUED | OUTPATIENT
Start: 2025-05-07 | End: 2025-05-07 | Stop reason: SURG

## 2025-05-07 RX ORDER — PROPOFOL 10 MG/ML
VIAL (ML) INTRAVENOUS AS NEEDED
Status: DISCONTINUED | OUTPATIENT
Start: 2025-05-07 | End: 2025-05-07 | Stop reason: SURG

## 2025-05-07 RX ORDER — SODIUM CHLORIDE, SODIUM LACTATE, POTASSIUM CHLORIDE, CALCIUM CHLORIDE 600; 310; 30; 20 MG/100ML; MG/100ML; MG/100ML; MG/100ML
30 INJECTION, SOLUTION INTRAVENOUS CONTINUOUS
Status: DISCONTINUED | OUTPATIENT
Start: 2025-05-07 | End: 2025-05-07 | Stop reason: HOSPADM

## 2025-05-07 RX ADMIN — PROPOFOL 50 MG: 10 INJECTION, EMULSION INTRAVENOUS at 10:44

## 2025-05-07 RX ADMIN — SODIUM CHLORIDE, POTASSIUM CHLORIDE, SODIUM LACTATE AND CALCIUM CHLORIDE: 600; 310; 30; 20 INJECTION, SOLUTION INTRAVENOUS at 10:42

## 2025-05-07 RX ADMIN — PROPOFOL 225 MCG/KG/MIN: 10 INJECTION, EMULSION INTRAVENOUS at 10:44

## 2025-05-07 RX ADMIN — LIDOCAINE HYDROCHLORIDE 100 MG: 20 INJECTION, SOLUTION EPIDURAL; INFILTRATION; INTRACAUDAL; PERINEURAL at 10:44

## 2025-05-07 NOTE — ANESTHESIA POSTPROCEDURE EVALUATION
Patient: Rajwinder An    Procedure Summary       Date: 05/07/25 Room / Location: [facility] ENDOSCOPY 4 / [facility] ENDOSCOPY    Anesthesia Start: 1042 Anesthesia Stop: 1117    Procedures:       ESOPHAGOGASTRODUODENOSCOPY with biopsies      COLONOSCOPY with cold snare polypectomy and clip application x 1. Random colon biopies Diagnosis:       Cirrhosis of liver without ascites, unspecified hepatic cirrhosis type      Hepatic encephalopathy      Esophageal varices without bleeding, unspecified esophageal varices type      History of colon polyps      FH: colon cancer      Functional diarrhea      (Cirrhosis of liver without ascites, unspecified hepatic cirrhosis type [K74.60])      (Hepatic encephalopathy [K76.82])      (Esophageal varices without bleeding, unspecified esophageal varices type [I85.00])      (History of colon polyps [Z86.0100])      (FH: colon cancer [Z80.0])      (Functional diarrhea [K59.1])    Surgeons: Debra Wilkinson MD Provider: Wilton Hernández CRNA    Anesthesia Type: general ASA Status: 3            Anesthesia Type: general    Vitals  Vitals Value Taken Time   /77 05/07/25 11:30   Temp 35.6 °C (96 °F) 05/07/25 11:30   Pulse 77 05/07/25 11:30   Resp 19 05/07/25 11:30   SpO2 100 % 05/07/25 11:30           Post Anesthesia Care and Evaluation    Post-procedure mental status: acceptable.  Pain management: satisfactory to patient    Airway patency: patent  Anesthetic complications: No anesthetic complications    Cardiovascular status: acceptable  Respiratory status: acceptable    Comments: Per chart review

## 2025-05-07 NOTE — H&P
Pre Procedure History & Physical    Chief Complaint:   Cirrhosis, screening for varices, diarrhea    Subjective     HPI:   65 yo F with history of cirrhosis here for screening for varices, diarrhea.    Past Medical History:   Past Medical History:   Diagnosis Date    Acid reflux     Cirrhosis     Colitis     Diabetes     Endometriosis     Esophageal varices     Gastroparesis     Hiatal hernia     Hyperlipidemia     Hypertension     Intraductal papilloma of breast, right     Knee swelling [id number]    Non-alcoholic fatty liver disease        Past Surgical History:  Past Surgical History:   Procedure Laterality Date    BREAST SURGERY      CHOLECYSTECTOMY  12/04/2006    COLON RESECTION  12/04/1998    [facility]    COLONOSCOPY      HYSTERECTOMY      TONSILLECTOMY  12/13/1972    UPPER GASTROINTESTINAL ENDOSCOPY      [facility]       Family History:  Family History   Problem Relation Age of Onset    Hypertension Father     Heart disease Father     Heart disease Mother     Hypertension Mother     Osteoporosis Mother     Diabetes Maternal Aunt     Diabetes Maternal Uncle     Diabetes Paternal Aunt     Diabetes Paternal Uncle     Colon cancer Neg Hx     Breast cancer Neg Hx     Ovarian cancer Neg Hx     Uterine cancer Neg Hx     Prostate cancer Neg Hx        Social History:   reports that she has never smoked. She has never been exposed to tobacco smoke. She has never used smokeless tobacco. She reports that she does not drink alcohol and does not use drugs.    Medications:   Medications Prior to Admission   Medication Sig Dispense Refill Last Dose/Taking    brimonidine-timolol (COMBIGAN) 0.2-0.5 % ophthalmic solution INSTILL ONE DROP INTO BOTH EYES TWICE DAILY       buPROPion XL (Wellbutrin XL) 150 MG 24 hr tablet Take 1 tablet by mouth Every Morning. 90 tablet 3     carvedilol (COREG) 12.5 MG tablet Take 1 tablet by mouth 2 (Two) Times a Day With Meals. 180 tablet 1     cetirizine (ZyrTEC Allergy) 10  MG tablet Zyrtec 10 mg oral tablet take 1 tablet (10 mg) by oral route once daily   Active       cholestyramine (QUESTRAN) 4 GM/DOSE powder Take 1 packet by mouth Daily. mixed with a liquid 378 g 3     Continuous Glucose Sensor (Dexcom G7 Sensor) misc Use 1 each Every 10 (Ten) Days. 1 each 5     diclofenac (VOLTAREN) 75 MG EC tablet Take 1 tablet twice a day as needed for left knee pain and arthritis. 180 tablet 1     estradiol (ESTRACE VAGINAL) 0.1 MG/GM vaginal cream Place 0.5 gm PV and massage 0.5 gm to vulva and vestibule at night 2x/week 42.5 g 11     ezetimibe (ZETIA) 10 MG tablet Take 1 tablet by mouth Daily. 90 tablet 1     famotidine (PEPCID) 40 MG tablet        lisinopril (PRINIVIL,ZESTRIL) 20 MG tablet Take 1 tablet by mouth Daily. 90 tablet 1     ondansetron (ZOFRAN) 4 MG tablet Take 1 tablet by mouth Every 8 (Eight) Hours As Needed for Nausea or Vomiting. 30 tablet 2     riFAXIMin (Xifaxan) 550 MG tablet Take 1 tablet by mouth Every 12 (Twelve) Hours. 180 tablet 3     simvastatin (Zocor) 10 MG tablet Take 1 tablet by mouth Every Night. 90 tablet 1     sodium-potassium-magnesium sulfates (Suprep Bowel Prep Kit) 17.5-3.13-1.6 GM/177ML solution oral solution Take 2 bottles by mouth Take As Directed. (Patient not taking: Reported on 5/2/2025) 177 mL 0     Tirzepatide 5 MG/0.5ML solution auto-injector Inject 5 mg under the skin into the appropriate area as directed 1 (One) Time Per Week. 6 mL 3     vitamin D (ERGOCALCIFEROL) 1.25 MG (81643 UT) capsule capsule Take 1 capsule by mouth 1 (One) Time Per Week. 13 capsule 3        Allergies:  Penicillins    ROS:    Pertinent items are noted in HPI     Objective     Weight 71.3 kg (157 lb 3 oz).    Physical Exam   Constitutional: Pt is oriented to person, place, and time and well-developed, well-nourished, and in no distress.   Mouth/Throat: Oropharynx is clear and moist.   Neck: Normal range of motion.   Cardiovascular: Normal rate, regular rhythm and normal heart  sounds.    Pulmonary/Chest: Effort normal and breath sounds normal.   Abdominal: Soft. Nontender  Skin: Skin is warm and dry.   Psychiatric: Mood, memory, affect and judgment normal.     Assessment & Plan     Diagnosis:  Cirrhosis, screening for varices, diarrhea    Anticipated Surgical Procedure:  EGD/colonoscopy    The risks, benefits, and alternatives of this procedure have been discussed with the patient or the responsible party- the patient understands and agrees to proceed.

## 2025-05-08 ENCOUNTER — TELEPHONE (OUTPATIENT)
Dept: GASTROENTEROLOGY | Facility: CLINIC | Age: 65
End: 2025-05-08
Payer: COMMERCIAL

## 2025-05-08 ENCOUNTER — RESULTS FOLLOW-UP (OUTPATIENT)
Dept: GASTROENTEROLOGY | Facility: HOSPITAL | Age: 65
End: 2025-05-08
Payer: COMMERCIAL

## 2025-05-08 DIAGNOSIS — K52.832 LYMPHOCYTIC COLITIS: Primary | ICD-10-CM

## 2025-05-08 LAB
CYTO UR: NORMAL
LAB AP CASE REPORT: NORMAL
LAB AP CLINICAL INFORMATION: NORMAL
PATH REPORT.FINAL DX SPEC: NORMAL
PATH REPORT.GROSS SPEC: NORMAL

## 2025-05-08 RX ORDER — BUDESONIDE 3 MG/1
CAPSULE, COATED PELLETS ORAL
Qty: 84 CAPSULE | Refills: 0 | Status: SHIPPED | OUTPATIENT
Start: 2025-05-08 | End: 2025-06-19

## 2025-05-08 NOTE — TELEPHONE ENCOUNTER
Left voicemail for pt requesting a returned call        Letters sent recall placed care gap updated.

## 2025-05-16 ENCOUNTER — TELEPHONE (OUTPATIENT)
Dept: GASTROENTEROLOGY | Facility: CLINIC | Age: 65
End: 2025-05-16
Payer: COMMERCIAL

## 2025-05-16 NOTE — TELEPHONE ENCOUNTER
Spoke with patient and advised that the appointment   on 06/19/2025@11:30 is being rescheduled to 06/18/25@11:15  due to provider being out of office.     Patient is agreeable and verbalized understanding.

## 2025-05-28 ENCOUNTER — TELEPHONE (OUTPATIENT)
Dept: GASTROENTEROLOGY | Facility: CLINIC | Age: 65
End: 2025-05-28
Payer: COMMERCIAL

## 2025-05-28 NOTE — TELEPHONE ENCOUNTER
[facility] paperwork was faxed for Rajwinder An 1960 Paperwork placed in ALBA Gregorio's medical assistant basket for completion. Patient need to pay for  [facility] paperwork before it is sent to employee

## 2025-06-03 ENCOUNTER — TELEPHONE (OUTPATIENT)
Dept: GASTROENTEROLOGY | Facility: CLINIC | Age: 65
End: 2025-06-03
Payer: COMMERCIAL

## 2025-06-03 NOTE — TELEPHONE ENCOUNTER
Left voice message for patient to return call regarding her [facility] paperwork. It has been filled out. She needs to pay the $25.00 fee before we can fax the paperwork.

## 2025-07-12 ENCOUNTER — HOSPITAL ENCOUNTER (OUTPATIENT)
Dept: BONE DENSITY | Facility: HOSPITAL | Age: 65
Discharge: HOME | End: 2025-07-12
Payer: COMMERCIAL

## 2025-07-12 ENCOUNTER — HOSPITAL ENCOUNTER (OUTPATIENT)
Dept: MAMMOGRAPHY | Facility: HOSPITAL | Age: 65
Discharge: HOME | End: 2025-07-12
Payer: COMMERCIAL

## 2025-07-12 DIAGNOSIS — Z00.00 ANNUAL PHYSICAL EXAM: ICD-10-CM

## 2025-07-12 DIAGNOSIS — Z12.31 ENCOUNTER FOR SCREENING MAMMOGRAM FOR BREAST CANCER: ICD-10-CM

## 2025-07-12 DIAGNOSIS — Z13.820 OSTEOPOROSIS SCREENING: ICD-10-CM

## 2025-07-12 PROCEDURE — 77080 DXA BONE DENSITY AXIAL: CPT

## 2025-07-12 PROCEDURE — 77063 BREAST TOMOSYNTHESIS BI: CPT

## 2025-07-12 PROCEDURE — 77067 SCR MAMMO BI INCL CAD: CPT

## 2025-07-14 ENCOUNTER — PATIENT MESSAGE (OUTPATIENT)
Dept: OBSTETRICS AND GYNECOLOGY | Age: 65
End: 2025-07-14
Payer: COMMERCIAL

## (undated) DEVICE — SOL IRRG H2O PL/BG 1000ML STRL

## (undated) DEVICE — BLCK/BITE BLOX WO/DENTL/RIM W/STRAP 54F

## (undated) DEVICE — SINGLE-USE BIOPSY FORCEPS: Brand: RADIAL JAW 4

## (undated) DEVICE — LINER SURG CANSTR SXN S/RIGD 1500CC

## (undated) DEVICE — THE STERILE LIGHT HANDLE COVER IS USED WITH STERIS SURGICAL LIGHTING AND VISUALIZATION SYSTEMS.

## (undated) DEVICE — DEFENDO AIR WATER SUCTION AND BIOPSY VALVE KIT FOR  OLYMPUS: Brand: DEFENDO AIR/WATER/SUCTION AND BIOPSY VALVE

## (undated) DEVICE — CONN JET HYDRA H20 AUXILIARY DISP

## (undated) DEVICE — Device

## (undated) DEVICE — SNAR POLYP CAPTIFLEX XS/OVL 11X2.4MM 240CM 1P/U

## (undated) DEVICE — THE SINGLE USE ETRAP – POLYP TRAP IS USED FOR SUCTION RETRIEVAL OF ENDOSCOPICALLY REMOVED POLYPS.: Brand: ETRAP

## (undated) DEVICE — SOLIDIFIER LIQLOC PLS 1500CC BT